# Patient Record
Sex: FEMALE | Race: WHITE | NOT HISPANIC OR LATINO | Employment: OTHER | URBAN - METROPOLITAN AREA
[De-identification: names, ages, dates, MRNs, and addresses within clinical notes are randomized per-mention and may not be internally consistent; named-entity substitution may affect disease eponyms.]

---

## 2024-02-20 ENCOUNTER — PRE-ADMISSION TESTING (OUTPATIENT)
Dept: PREADMISSION TESTING | Facility: HOSPITAL | Age: 84
End: 2024-02-20
Payer: MEDICARE

## 2024-02-20 VITALS
HEIGHT: 63 IN | BODY MASS INDEX: 29.69 KG/M2 | WEIGHT: 167.55 LBS | DIASTOLIC BLOOD PRESSURE: 74 MMHG | SYSTOLIC BLOOD PRESSURE: 132 MMHG | HEART RATE: 62 BPM | OXYGEN SATURATION: 97 % | TEMPERATURE: 95.9 F | RESPIRATION RATE: 18 BRPM

## 2024-02-20 DIAGNOSIS — Z01.818 PRE-OP EXAMINATION: Primary | ICD-10-CM

## 2024-02-20 LAB
ALBUMIN SERPL BCP-MCNC: 4 G/DL (ref 3.4–5)
ALP SERPL-CCNC: 52 U/L (ref 33–136)
ALT SERPL W P-5'-P-CCNC: 14 U/L (ref 7–45)
ANION GAP SERPL CALC-SCNC: 16 MMOL/L (ref 10–20)
APPEARANCE UR: ABNORMAL
AST SERPL W P-5'-P-CCNC: 15 U/L (ref 9–39)
BASOPHILS # BLD AUTO: 0.01 X10*3/UL (ref 0–0.1)
BASOPHILS NFR BLD AUTO: 0.2 %
BILIRUB SERPL-MCNC: 0.5 MG/DL (ref 0–1.2)
BILIRUB UR STRIP.AUTO-MCNC: NEGATIVE MG/DL
BUN SERPL-MCNC: 26 MG/DL (ref 6–23)
CALCIUM SERPL-MCNC: 8.8 MG/DL (ref 8.6–10.3)
CHLORIDE SERPL-SCNC: 105 MMOL/L (ref 98–107)
CO2 SERPL-SCNC: 26 MMOL/L (ref 21–32)
COLOR UR: YELLOW
CREAT SERPL-MCNC: 0.98 MG/DL (ref 0.5–1.05)
EGFRCR SERPLBLD CKD-EPI 2021: 58 ML/MIN/1.73M*2
EOSINOPHIL # BLD AUTO: 0.07 X10*3/UL (ref 0–0.4)
EOSINOPHIL NFR BLD AUTO: 1.2 %
ERYTHROCYTE [DISTWIDTH] IN BLOOD BY AUTOMATED COUNT: 13 % (ref 11.5–14.5)
GLUCOSE SERPL-MCNC: 107 MG/DL (ref 74–99)
GLUCOSE UR STRIP.AUTO-MCNC: NEGATIVE MG/DL
HCT VFR BLD AUTO: 38.4 % (ref 36–46)
HGB BLD-MCNC: 12.5 G/DL (ref 12–16)
IMM GRANULOCYTES # BLD AUTO: 0.02 X10*3/UL (ref 0–0.5)
IMM GRANULOCYTES NFR BLD AUTO: 0.3 % (ref 0–0.9)
KETONES UR STRIP.AUTO-MCNC: NEGATIVE MG/DL
LEUKOCYTE ESTERASE UR QL STRIP.AUTO: NEGATIVE
LYMPHOCYTES # BLD AUTO: 1.79 X10*3/UL (ref 0.8–3)
LYMPHOCYTES NFR BLD AUTO: 29.8 %
MCH RBC QN AUTO: 29.8 PG (ref 26–34)
MCHC RBC AUTO-ENTMCNC: 32.6 G/DL (ref 32–36)
MCV RBC AUTO: 91 FL (ref 80–100)
MONOCYTES # BLD AUTO: 0.46 X10*3/UL (ref 0.05–0.8)
MONOCYTES NFR BLD AUTO: 7.7 %
NEUTROPHILS # BLD AUTO: 3.66 X10*3/UL (ref 1.6–5.5)
NEUTROPHILS NFR BLD AUTO: 60.8 %
NITRITE UR QL STRIP.AUTO: NEGATIVE
NRBC BLD-RTO: 0 /100 WBCS (ref 0–0)
PH UR STRIP.AUTO: 5 [PH]
PLATELET # BLD AUTO: 153 X10*3/UL (ref 150–450)
POTASSIUM SERPL-SCNC: 4.1 MMOL/L (ref 3.5–5.3)
PROT SERPL-MCNC: 6.3 G/DL (ref 6.4–8.2)
PROT UR STRIP.AUTO-MCNC: NEGATIVE MG/DL
RBC # BLD AUTO: 4.2 X10*6/UL (ref 4–5.2)
RBC # UR STRIP.AUTO: NEGATIVE /UL
SODIUM SERPL-SCNC: 143 MMOL/L (ref 136–145)
SP GR UR STRIP.AUTO: 1.03
UROBILINOGEN UR STRIP.AUTO-MCNC: <2 MG/DL
WBC # BLD AUTO: 6 X10*3/UL (ref 4.4–11.3)

## 2024-02-20 PROCEDURE — 80053 COMPREHEN METABOLIC PANEL: CPT | Performed by: REGISTERED NURSE

## 2024-02-20 PROCEDURE — 85025 COMPLETE CBC W/AUTO DIFF WBC: CPT | Performed by: REGISTERED NURSE

## 2024-02-20 PROCEDURE — 93010 ELECTROCARDIOGRAM REPORT: CPT | Performed by: INTERNAL MEDICINE

## 2024-02-20 PROCEDURE — 81003 URINALYSIS AUTO W/O SCOPE: CPT | Performed by: REGISTERED NURSE

## 2024-02-20 PROCEDURE — 99204 OFFICE O/P NEW MOD 45 MIN: CPT | Performed by: REGISTERED NURSE

## 2024-02-20 PROCEDURE — 36415 COLL VENOUS BLD VENIPUNCTURE: CPT

## 2024-02-20 PROCEDURE — 87081 CULTURE SCREEN ONLY: CPT | Mod: GEALAB | Performed by: REGISTERED NURSE

## 2024-02-20 PROCEDURE — 93005 ELECTROCARDIOGRAM TRACING: CPT

## 2024-02-20 RX ORDER — CHLORHEXIDINE GLUCONATE ORAL RINSE 1.2 MG/ML
15 SOLUTION DENTAL 2 TIMES DAILY
Qty: 120 ML | Refills: 0 | Status: SHIPPED | OUTPATIENT
Start: 2024-02-20 | End: 2024-02-21

## 2024-02-20 RX ORDER — OXYBUTYNIN CHLORIDE 15 MG/1
15 TABLET, EXTENDED RELEASE ORAL DAILY
COMMUNITY

## 2024-02-20 RX ORDER — ATORVASTATIN CALCIUM 80 MG/1
80 TABLET, FILM COATED ORAL DAILY
COMMUNITY

## 2024-02-20 RX ORDER — SERTRALINE HYDROCHLORIDE 50 MG/1
50 TABLET, FILM COATED ORAL DAILY
COMMUNITY

## 2024-02-20 RX ORDER — LATANOPROST 50 UG/ML
1 SOLUTION/ DROPS OPHTHALMIC NIGHTLY
COMMUNITY

## 2024-02-20 RX ORDER — ACETAMINOPHEN 500 MG
TABLET ORAL EVERY 6 HOURS PRN
COMMUNITY
End: 2024-03-04 | Stop reason: HOSPADM

## 2024-02-20 RX ORDER — GINGER ROOT/GINGER ROOT EXT 262.5 MG
25000 CAPSULE ORAL DAILY
COMMUNITY
End: 2024-02-20 | Stop reason: ALTCHOICE

## 2024-02-20 RX ORDER — DICLOFENAC SODIUM 10 MG/G
4 GEL TOPICAL 4 TIMES DAILY PRN
COMMUNITY
End: 2024-02-20 | Stop reason: ALTCHOICE

## 2024-02-20 RX ORDER — LISINOPRIL 40 MG/1
40 TABLET ORAL DAILY
COMMUNITY

## 2024-02-20 ASSESSMENT — DUKE ACTIVITY SCORE INDEX (DASI)
CAN YOU PARTICIPATE IN MODERATE RECREATIONAL ACTIVITIES LIKE GOLF, BOWLING, DANCING, DOUBLES TENNIS OR THROWING A BASEBALL OR FOOTBALL: NO
CAN YOU WALK A BLOCK OR TWO ON LEVEL GROUND: YES
TOTAL_SCORE: 26.95
CAN YOU CLIMB A FLIGHT OF STAIRS OR WALK UP A HILL: YES
CAN YOU DO LIGHT WORK AROUND THE HOUSE LIKE DUSTING OR WASHING DISHES: YES
CAN YOU RUN A SHORT DISTANCE: NO
CAN YOU DO MODERATE WORK AROUND THE HOUSE LIKE VACUUMING, SWEEPING FLOORS OR CARRYING GROCERIES: YES
DASI METS SCORE: 6.1
CAN YOU WALK INDOORS, SUCH AS AROUND YOUR HOUSE: YES
CAN YOU TAKE CARE OF YOURSELF (EAT, DRESS, BATHE, OR USE TOILET): YES
CAN YOU PARTICIPATE IN STRENOUS SPORTS LIKE SWIMMING, SINGLES TENNIS, FOOTBALL, BASKETBALL, OR SKIING: NO
CAN YOU DO HEAVY WORK AROUND THE HOUSE LIKE SCRUBBING FLOORS OR LIFTING AND MOVING HEAVY FURNITURE: YES
CAN YOU DO YARD WORK LIKE RAKING LEAVES, WEEDING OR PUSHING A MOWER: NO
CAN YOU HAVE SEXUAL RELATIONS: NO

## 2024-02-20 ASSESSMENT — ENCOUNTER SYMPTOMS
JOINT SWELLING: 1
NECK NEGATIVE: 1
LIMITED RANGE OF MOTION: 1
ARTHRALGIAS: 1
NEUROLOGICAL NEGATIVE: 1
RESPIRATORY NEGATIVE: 1
GASTROINTESTINAL NEGATIVE: 1
CONSTITUTIONAL NEGATIVE: 1
CARDIOVASCULAR NEGATIVE: 1
EYES NEGATIVE: 1

## 2024-02-20 ASSESSMENT — LIFESTYLE VARIABLES: SMOKING_STATUS: NONSMOKER

## 2024-02-20 NOTE — PREPROCEDURE INSTRUCTIONS
Medication List            Accurate as of February 20, 2024  1:56 PM. Always use your most recent med list.                acetaminophen 500 mg tablet  Commonly known as: Tylenol  Medication Adjustments for Surgery: Stop 1 day before surgery     atorvastatin 80 mg tablet  Commonly known as: Lipitor  Medication Adjustments for Surgery: Take morning of surgery with sip of water, no other fluids     chlorhexidine 0.12 % solution  Commonly known as: Peridex  Use 15 mL in the mouth or throat 2 times a day for 1 day. Use 15ml once the night before surgery and  15ml once the morning of surgery  Medication Adjustments for Surgery: Other (Comment)     latanoprost 0.005 % ophthalmic solution  Commonly known as: Xalatan  Medication Adjustments for Surgery: Continue until night before surgery     lisinopril 40 mg tablet  Medication Adjustments for Surgery: Stop 1 day before surgery     oxybutynin XL 15 mg 24 hr tablet  Commonly known as: Ditropan-XL  Medication Adjustments for Surgery: Stop 1 day before surgery     sertraline 50 mg tablet  Commonly known as: Zoloft  Medication Adjustments for Surgery: Take morning of surgery with sip of water, no other fluids          SURGERY PRE-OPERATIVE INSTRUCTIONS    *You will receive a phone call the day before your procedure  after 2pm, (or the Friday before your surgery if scheduled on a Monday.) Generally the hospital will be calling you with this information after that time.    *You are not to eat after midnight the night before the surgery. You may have 8oz of a clear liquid up until 2 hours prior to arriving to the hospital. The exception is with medications you were instructed to take day of surgery.    *You may take tylenol for pain/discomfort as needed.     *Stop taking all aspirin products, ibuprofen (motrin/advil), naproxen (aleve/naprosyn) for one week prior to surgery.    *Stop taking all vitamins and supplements one week prior to surgery.     *You should not have  alcoholic beverages for 24 hours before surgery.     *You should not smoke 24 hours prior to surgery.     *To help prevent surgical infections bathe/shower with Dial soap the evening before surgery.    *You can wear deodorant but no lotion, powder, or perfume/cologne. You should remove all make-up and nail polish at home.    *If you wear glasses, please bring a case for the glasses with you.    *You will be asked to remove dentures and contacts.     *Please leave all valuables at home.    *You should wear loose, comfortable clothing that will accommodate bandages and/or casts.    *You should notify your doctor of any change in your condition (fever, cold, rash, etc). Surgery may need to be re-scheduled until a time you are in better health.    *A responsible adult is required to accompany you to and from the hospital if you are receiving anesthesia or a sedative. Patients are not permitted to drive for 24 hours after anesthesia.     *You can use the [employer] if you wish.     *If you have any further questions please call [facility] [phone].   CHG BODY WASH INSTRUCTIONS    *Begin using your CHG soap five days prior to your scheduled surgery.  Allow the CHG soap to sit on skin for 3 minutes. Do not wash with regular soap after you have used the CHG soap. Pat yourself dry with a clean, fresh towel.    *Wash your face with normal soap and water. Apply the CHG solution to a clean, wet washcloth. Firmly lather your entire body from the neck down. Do not use on your face.     *Do not apply powders, deodorants, or lotions after using CHG wash.    *Dress in clean, freshly laundered night clothes.    *Be sure to sleep with clean, freshly laundered sheets.     *Be aware CHG wash may cause stains on fabrics. Rinse your washcloth and other linens that come in contact with CHG completely. Use non-chlorine detergents to launder items used.     *The morning of surgery is the fifth day, repeat the CHG wash and wear fresh  laundered clothes.     *If you have any questions about the CHG soap, call [phone].      MOUTHRINSE INSTRUCTIONS    *CHG oral rinse is used to kill a bacteria in the mouth known as Staphylococcus aureus. This reduces the risks of surgical site infections.     *Using dental rinse: use the CHG oral rinse after you brush your teeth the night before and the morning of the surgery. Follow all directions on your prescription label.     *Use 1 capful (15ml), swish and gargle for at least 30 seconds. Do not swallow. Spit rinse out.     *Do not rinse mouth with water, eat or drink after using CHG mouth rinse.     *Possible side effects: CHG rinse will stick to plaque on teeth. Brush and floss just before use. Teeth brushing will help to avoid staining of plaque during use.    *Any questions, please call [phone].                       NPO Instructions:    Do not eat any food after midnight the night before your surgery/procedure.    Additional Instructions:     Review your medication instructions, stop indicated medications

## 2024-02-20 NOTE — H&P (VIEW-ONLY)
CPM/PAT Evaluation       Name: Samanta Muñoz (Samanta Muñoz)  /Age: 1940/83 y.o.     In-Person       Chief Complaint: Evaluation prior to surgery    HPI  83 year old female scheduled for ARTHROPLASTY RESURFACING TOTAL KNEE - Left on 3/4/24 with Dr. Ibarra secondary to primary osteoarthritis of left knee. PMHx includes TN, HLD, DM and depression. Presents to CPM today for preoperative risk stratification and optimization.   Past Medical History:   Diagnosis Date    Arthritis     Depression     Glaucoma     Hyperlipidemia     Hypertension     Macular degeneration     Personal history of other diseases of the circulatory system 2014    History of hypertension    Personal history of other diseases of the circulatory system 2014    Personal history of cardiac murmur    Personal history of other diseases of the musculoskeletal system and connective tissue 2014    Personal history of arthritis    Personal history of other infectious and parasitic diseases 2014    History of mumps    Personal history of other mental and behavioral disorders 2014    History of depression    Sleep apnea     Unspecified visual disturbance 2014    Vision disorder       Past Surgical History:   Procedure Laterality Date    APPENDECTOMY  2014    Appendectomy    BREAST BIOPSY      CATARACT EXTRACTION  2014    Cataract Surgery    COLONOSCOPY      ESOPHAGOGASTRODUODENOSCOPY      KNEE ARTHROPLASTY      KNEE SURGERY  2014    Knee Surgery Right       Patient Sexual activity questions deferred to the physician.    No family history on file.    Allergies   Allergen Reactions    Iodinated Contrast Media Rash       Prior to Admission medications    Not on File        PAT ROS:   Constitutional:   neg    Neuro/Psych:   neg    Eyes:   neg    Ears:   neg    Nose:   Mouth:   neg    Throat:   neg    Neck:    Decreased ROM  neg    Cardio:   neg    Respiratory:   neg    Endocrine:   GI:   neg    :    neg    Musculoskeletal:    Left knee   arthralgias   decreased ROM   swelling  Hematologic:   neg    Skin:  neg        Physical Exam  Vitals reviewed.   Constitutional:       Appearance: Normal appearance.   HENT:      Head: Normocephalic and atraumatic.      Nose: Nose normal.   Eyes:      Pupils: Pupils are equal, round, and reactive to light.   Neck:      Vascular: No carotid bruit.      Comments: Decreased ROM  Cardiovascular:      Rate and Rhythm: Normal rate and regular rhythm.      Pulses: Normal pulses.      Heart sounds: Murmur (2/6) heard.   Pulmonary:      Effort: Pulmonary effort is normal.      Breath sounds: Normal breath sounds.   Abdominal:      Palpations: Abdomen is soft.   Musculoskeletal:         General: Swelling and tenderness present.      Cervical back: Normal range of motion and neck supple.      Comments: Left knee   Skin:     General: Skin is warm and dry.      Capillary Refill: Capillary refill takes less than 2 seconds.   Neurological:      Mental Status: She is alert and oriented to person, place, and time.   Psychiatric:         Mood and Affect: Mood normal.         Behavior: Behavior normal.         Thought Content: Thought content normal.         Judgment: Judgment normal.          PAT AIRWAY:   Airway:     Mallampati::  III    TM distance::  >3 FB    Neck ROM::  Limited  normal        There were no vitals taken for this visit.    DASI Risk Score    No data to display       Caprini DVT Assessment      Flowsheet Row Most Recent Value   DVT Score 12   Current Status Major surgery planned, including arthroscopic and laproscopic (1-2 hours), Elective major lower extremity arthroplasty   History Prior major surgery   Age Over 75 years   BMI 30 or less          Modified Frailty Index    No data to display       CHADS2 Stroke Risk  Current as of 24 minutes ago        N/A 3 - 100%: High Risk   2 - 3%: Medium Risk   0 - 2%: Low Risk     Last Change: N/A          This score determines the  patient's risk of having a stroke if the patient has atrial fibrillation.        This score is not applicable to this patient. Components are not calculated.          Revised Cardiac Risk Index      Flowsheet Row Most Recent Value   Revised Cardiac Risk Calculator 0          Apfel Simplified Score      Flowsheet Row Most Recent Value   Apfel Simplified Score Calculator 3          Risk Analysis Index Results This Encounter    No data found in the last 1 encounters.       Stop Bang Score      Flowsheet Row Most Recent Value   Do you snore loudly? 1   Do you often feel tired or fatigued after your sleep? 0   Has anyone ever observed you stop breathing in your sleep? 0   Do you have or are you being treated for high blood pressure? 1   Recent BMI (Calculated) 29.7   Is BMI greater than 35 kg/m2? 0=No   Age older than 50 years old? 1=Yes   Is your neck circumference greater than 17 inches (Male) or 16 inches (Female)? 0   Gender - Male 0=No   STOP-BANG Total Score 3            Assessment and Plan:     Anesthesia:  The patient denies problems with anesthesia in the past such as PONV, prolonged sedation, awareness, dental damage, aspiration, cardiac arrest, difficult intubation, or unexpected hospital admissions.  Limited ROM of neck.    Neuro:   The patient has a history of depression controlled with Zoloft.  The patient is at increased risk for perioperative stroke secondary to hypertension , increased age, hyperlipidemia, female gender, diabetes mellitus.    HEENT/Airway  No diagnoses, significant findings on chart review, clinical presentation, or evaluation.    Cardiovascular  The patient has a history of HTN, HLD and cardiac murmur. HLD controlled with lipitor.   RCRI  The patient meets 0-1 RCRI criteria and therefore has a less than 1% risk of major adverse cardiac complications.  METS  The patient's functional capacity capacity is greater than 4 METS.  EKG  The patient has no EKG or echocardiographic changes  concerning for myocardial ischemia. EKG 2/20/24 Sinus bradycardia, left axis deviation, right bundle branch block Rate 54.  EKG 10/26/23 NSR Septal infarct, abnormal EKG. When compared to EKG of 3/15/18 left anterior fascicular block is no longer present Rate 60.  Heart Failure  The patient has no known history of heart failure.  Additionally, the patient reports no symptoms of heart failure and demonstrates no signs of heart failure.  Hypertension Evaluation  The patient has a known history of hypertension that is controlled on lisinopril.   Heart Rhythm Evaluation  The patient has no history of arrhythmias.  Heart Valve Evaluation  The patient has a known history of heart murmur.  CARDS EVAL  The patient is not followed by cardiology.  DULCE score which indicates a 0.8% risk of intraoperative or 30-day postoperative.    Pulmonary   The patient has findings on chart review, clinical presentation and evaluation significant for LEO. The patient is at increased risk of perioperative pulmonary complications secondary to LEO, advanced age greater than 60.Does not use device for LEO.  The patient has a stop bang score of 3, which places patient at intermediate risk for having LEO.    ARISCAT 16, low, 1.6% risk of in-hospital postoperative pulmonary complications  PRODIGY 21, high risk of respiratory depression episode.    Hematology  No diagnoses or significant findings on chart review or clinical presentation and evaluation.  Antiplatelet management   The patient is not currently receiving antiplatelet therapy.  Anticoagulation management  The patient is not currently receiving anticoagulation therapy.  Caprini score 12, high risk of perioperative VTE    Gastrointestinal  No diagnoses or significant findings on chart review or clinical presentation and evaluation.  Eat 10- 0,  self-perceived oropharyngeal dysphagia scale (0-40)     Genitourinary  The patient has diagnoses or significant findings on chart review or  clinical presentation and evaluation significant for Overactive bladded controlled with oxybutynin xl.     Renal  The patient has no known history of chronic kidney disease. The patient has specific risk factors associated with increased risk of perioperative renal complications due to age greater than 55, hypertension.    Musculoskeletal  The patient has diagnoses or significant findings on chart review or clinical presentation and evaluation significant for primary osteoarthritis of left knee.    Endocrine  Diabetes Evaluation  The patient has history of diabetes mellitus controlled and not on medication. A1C 10/24/23 6.1  Thyroid Disease Evaluation  The patient has no history of thyroid disease.    ID  MRSA screening obtained. Instructions given for Hibiclens and Peridex. Prescriptions given for Peridex.    -Preoperative medication instructions were provided and reviewed with the patient.  Any additional testing or evaluation was explained to the patient.  NPO Instructions were discussed, and the patient's questions were answered prior to conclusion of this encounter

## 2024-02-21 LAB — HOLD SPECIMEN: NORMAL

## 2024-02-22 LAB — STAPHYLOCOCCUS SPEC CULT: NORMAL

## 2024-02-28 ENCOUNTER — ANESTHESIA EVENT (OUTPATIENT)
Dept: OPERATING ROOM | Facility: HOSPITAL | Age: 84
End: 2024-02-28
Payer: MEDICARE

## 2024-03-01 ENCOUNTER — TELEPHONE (OUTPATIENT)
Dept: INPATIENT UNIT | Facility: HOSPITAL | Age: 84
End: 2024-03-01
Payer: MEDICARE

## 2024-03-01 RX ORDER — SODIUM CHLORIDE, SODIUM LACTATE, POTASSIUM CHLORIDE, CALCIUM CHLORIDE 600; 310; 30; 20 MG/100ML; MG/100ML; MG/100ML; MG/100ML
100 INJECTION, SOLUTION INTRAVENOUS CONTINUOUS
Status: CANCELLED | OUTPATIENT
Start: 2024-03-01

## 2024-03-01 RX ORDER — DROPERIDOL 2.5 MG/ML
0.62 INJECTION, SOLUTION INTRAMUSCULAR; INTRAVENOUS ONCE AS NEEDED
Status: CANCELLED | OUTPATIENT
Start: 2024-03-01

## 2024-03-04 ENCOUNTER — ANESTHESIA (OUTPATIENT)
Dept: OPERATING ROOM | Facility: HOSPITAL | Age: 84
End: 2024-03-04
Payer: MEDICARE

## 2024-03-04 ENCOUNTER — APPOINTMENT (OUTPATIENT)
Dept: RADIOLOGY | Facility: HOSPITAL | Age: 84
End: 2024-03-04
Payer: MEDICARE

## 2024-03-04 ENCOUNTER — HOME HEALTH ADMISSION (OUTPATIENT)
Dept: HOME HEALTH SERVICES | Facility: HOME HEALTH | Age: 84
End: 2024-03-04
Payer: MEDICARE

## 2024-03-04 ENCOUNTER — HOSPITAL ENCOUNTER (OUTPATIENT)
Facility: HOSPITAL | Age: 84
Setting detail: OUTPATIENT SURGERY
Discharge: HOME HEALTH | End: 2024-03-04
Attending: ORTHOPAEDIC SURGERY | Admitting: ORTHOPAEDIC SURGERY
Payer: MEDICARE

## 2024-03-04 ENCOUNTER — DOCUMENTATION (OUTPATIENT)
Dept: HOME HEALTH SERVICES | Facility: HOME HEALTH | Age: 84
End: 2024-03-04

## 2024-03-04 VITALS
DIASTOLIC BLOOD PRESSURE: 67 MMHG | SYSTOLIC BLOOD PRESSURE: 148 MMHG | TEMPERATURE: 97.2 F | RESPIRATION RATE: 19 BRPM | OXYGEN SATURATION: 96 % | HEART RATE: 80 BPM

## 2024-03-04 DIAGNOSIS — Z96.652 STATUS POST TOTAL KNEE REPLACEMENT, LEFT: Primary | ICD-10-CM

## 2024-03-04 PROCEDURE — 3600000010 HC OR TIME - EACH INCREMENTAL 1 MINUTE - PROCEDURE LEVEL FIVE: Performed by: ORTHOPAEDIC SURGERY

## 2024-03-04 PROCEDURE — 3700000002 HC GENERAL ANESTHESIA TIME - EACH INCREMENTAL 1 MINUTE: Performed by: ORTHOPAEDIC SURGERY

## 2024-03-04 PROCEDURE — 7100000009 HC PHASE TWO TIME - INITIAL BASE CHARGE: Performed by: ORTHOPAEDIC SURGERY

## 2024-03-04 PROCEDURE — A4217 STERILE WATER/SALINE, 500 ML: HCPCS | Mod: MUE | Performed by: ORTHOPAEDIC SURGERY

## 2024-03-04 PROCEDURE — 2500000001 HC RX 250 WO HCPCS SELF ADMINISTERED DRUGS (ALT 637 FOR MEDICARE OP): Performed by: ANESTHESIOLOGY

## 2024-03-04 PROCEDURE — 2720000007 HC OR 272 NO HCPCS: Performed by: ORTHOPAEDIC SURGERY

## 2024-03-04 PROCEDURE — 73560 X-RAY EXAM OF KNEE 1 OR 2: CPT | Mod: LT

## 2024-03-04 PROCEDURE — 2780000003 HC OR 278 NO HCPCS: Performed by: ORTHOPAEDIC SURGERY

## 2024-03-04 PROCEDURE — 97110 THERAPEUTIC EXERCISES: CPT | Mod: GP

## 2024-03-04 PROCEDURE — 2500000004 HC RX 250 GENERAL PHARMACY W/ HCPCS (ALT 636 FOR OP/ED): Performed by: ANESTHESIOLOGY

## 2024-03-04 PROCEDURE — A9999 DME SUPPLY OR ACCESSORY, NOS: HCPCS | Performed by: ORTHOPAEDIC SURGERY

## 2024-03-04 PROCEDURE — A6213 FOAM DRG >16<=48 SQ IN W/BDR: HCPCS | Performed by: ORTHOPAEDIC SURGERY

## 2024-03-04 PROCEDURE — 7100000010 HC PHASE TWO TIME - EACH INCREMENTAL 1 MINUTE: Performed by: ORTHOPAEDIC SURGERY

## 2024-03-04 PROCEDURE — 2500000005 HC RX 250 GENERAL PHARMACY W/O HCPCS: Performed by: NURSE ANESTHETIST, CERTIFIED REGISTERED

## 2024-03-04 PROCEDURE — 73560 X-RAY EXAM OF KNEE 1 OR 2: CPT | Mod: LEFT SIDE | Performed by: RADIOLOGY

## 2024-03-04 PROCEDURE — 97116 GAIT TRAINING THERAPY: CPT | Mod: GP

## 2024-03-04 PROCEDURE — 97161 PT EVAL LOW COMPLEX 20 MIN: CPT | Mod: GP

## 2024-03-04 PROCEDURE — 2500000004 HC RX 250 GENERAL PHARMACY W/ HCPCS (ALT 636 FOR OP/ED): Performed by: NURSE ANESTHETIST, CERTIFIED REGISTERED

## 2024-03-04 PROCEDURE — 3700000001 HC GENERAL ANESTHESIA TIME - INITIAL BASE CHARGE: Performed by: ORTHOPAEDIC SURGERY

## 2024-03-04 PROCEDURE — A27447 PR TOTAL KNEE ARTHROPLASTY: Performed by: NURSE ANESTHETIST, CERTIFIED REGISTERED

## 2024-03-04 PROCEDURE — 3600000005 HC OR TIME - INITIAL BASE CHARGE - PROCEDURE LEVEL FIVE: Performed by: ORTHOPAEDIC SURGERY

## 2024-03-04 PROCEDURE — C1776 JOINT DEVICE (IMPLANTABLE): HCPCS | Performed by: ORTHOPAEDIC SURGERY

## 2024-03-04 PROCEDURE — 64447 NJX AA&/STRD FEMORAL NRV IMG: CPT | Performed by: NURSE ANESTHETIST, CERTIFIED REGISTERED

## 2024-03-04 PROCEDURE — 7100000002 HC RECOVERY ROOM TIME - EACH INCREMENTAL 1 MINUTE: Performed by: ORTHOPAEDIC SURGERY

## 2024-03-04 PROCEDURE — 2500000004 HC RX 250 GENERAL PHARMACY W/ HCPCS (ALT 636 FOR OP/ED): Performed by: ORTHOPAEDIC SURGERY

## 2024-03-04 PROCEDURE — 7100000001 HC RECOVERY ROOM TIME - INITIAL BASE CHARGE: Performed by: ORTHOPAEDIC SURGERY

## 2024-03-04 DEVICE — TIBIAL BEARING INSERT - CS
Type: IMPLANTABLE DEVICE | Site: KNEE | Status: FUNCTIONAL
Brand: TRIATHLON

## 2024-03-04 DEVICE — PATELLA
Type: IMPLANTABLE DEVICE | Site: KNEE | Status: FUNCTIONAL
Brand: TRIATHLON

## 2024-03-04 DEVICE — TIBIAL COMPONENT
Type: IMPLANTABLE DEVICE | Site: KNEE | Status: FUNCTIONAL
Brand: TRIATHLON

## 2024-03-04 DEVICE — CRUCIATE RETAINING FEMORAL
Type: IMPLANTABLE DEVICE | Site: KNEE | Status: FUNCTIONAL
Brand: TRIATHLON

## 2024-03-04 RX ORDER — ONDANSETRON 4 MG/1
4 TABLET, ORALLY DISINTEGRATING ORAL EVERY 8 HOURS PRN
Status: CANCELLED | OUTPATIENT
Start: 2024-03-04

## 2024-03-04 RX ORDER — SODIUM CHLORIDE, SODIUM LACTATE, POTASSIUM CHLORIDE, CALCIUM CHLORIDE 600; 310; 30; 20 MG/100ML; MG/100ML; MG/100ML; MG/100ML
100 INJECTION, SOLUTION INTRAVENOUS CONTINUOUS
Status: CANCELLED | OUTPATIENT
Start: 2024-03-04 | End: 2024-03-05

## 2024-03-04 RX ORDER — TRANEXAMIC ACID 100 MG/ML
INJECTION, SOLUTION INTRAVENOUS AS NEEDED
Status: DISCONTINUED | OUTPATIENT
Start: 2024-03-04 | End: 2024-03-04

## 2024-03-04 RX ORDER — DOCUSATE SODIUM 100 MG/1
100 CAPSULE, LIQUID FILLED ORAL 2 TIMES DAILY PRN
COMMUNITY
Start: 2024-03-04

## 2024-03-04 RX ORDER — OXYBUTYNIN CHLORIDE 5 MG/1
5 TABLET ORAL 2 TIMES DAILY
Status: CANCELLED | OUTPATIENT
Start: 2024-03-04

## 2024-03-04 RX ORDER — ACETAMINOPHEN 325 MG/1
975 TABLET ORAL EVERY 6 HOURS SCHEDULED
Status: CANCELLED | OUTPATIENT
Start: 2024-03-04

## 2024-03-04 RX ORDER — DOCUSATE SODIUM 100 MG/1
100 CAPSULE, LIQUID FILLED ORAL 2 TIMES DAILY
Status: CANCELLED | OUTPATIENT
Start: 2024-03-04

## 2024-03-04 RX ORDER — KETOROLAC TROMETHAMINE 15 MG/ML
15 INJECTION, SOLUTION INTRAMUSCULAR; INTRAVENOUS EVERY 6 HOURS
Status: CANCELLED | OUTPATIENT
Start: 2024-03-04 | End: 2024-03-05

## 2024-03-04 RX ORDER — OXYCODONE HYDROCHLORIDE 5 MG/1
10 TABLET ORAL EVERY 6 HOURS PRN
Status: CANCELLED | OUTPATIENT
Start: 2024-03-04

## 2024-03-04 RX ORDER — CELECOXIB 200 MG/1
200 CAPSULE ORAL 2 TIMES DAILY PRN
COMMUNITY
Start: 2024-03-04

## 2024-03-04 RX ORDER — CEFAZOLIN SODIUM 2 G/100ML
2 INJECTION, SOLUTION INTRAVENOUS EVERY 8 HOURS
Status: CANCELLED | OUTPATIENT
Start: 2024-03-04 | End: 2024-03-05

## 2024-03-04 RX ORDER — SODIUM CHLORIDE, SODIUM LACTATE, POTASSIUM CHLORIDE, CALCIUM CHLORIDE 600; 310; 30; 20 MG/100ML; MG/100ML; MG/100ML; MG/100ML
100 INJECTION, SOLUTION INTRAVENOUS CONTINUOUS
Status: DISCONTINUED | OUTPATIENT
Start: 2024-03-04 | End: 2024-03-04 | Stop reason: HOSPADM

## 2024-03-04 RX ORDER — CEFAZOLIN 1 G/1
INJECTION, POWDER, FOR SOLUTION INTRAVENOUS AS NEEDED
Status: DISCONTINUED | OUTPATIENT
Start: 2024-03-04 | End: 2024-03-04

## 2024-03-04 RX ORDER — ASCORBIC ACID 500 MG
500 TABLET ORAL 2 TIMES DAILY
COMMUNITY
Start: 2024-03-04

## 2024-03-04 RX ORDER — NALOXONE HYDROCHLORIDE 0.4 MG/ML
0.2 INJECTION, SOLUTION INTRAMUSCULAR; INTRAVENOUS; SUBCUTANEOUS EVERY 5 MIN PRN
Status: CANCELLED | OUTPATIENT
Start: 2024-03-04

## 2024-03-04 RX ORDER — FENTANYL CITRATE 50 UG/ML
INJECTION, SOLUTION INTRAMUSCULAR; INTRAVENOUS AS NEEDED
Status: DISCONTINUED | OUTPATIENT
Start: 2024-03-04 | End: 2024-03-04

## 2024-03-04 RX ORDER — CARISOPRODOL 350 MG/1
350 TABLET ORAL 3 TIMES DAILY PRN
Status: CANCELLED | OUTPATIENT
Start: 2024-03-04

## 2024-03-04 RX ORDER — GABAPENTIN 300 MG/1
300 CAPSULE ORAL 3 TIMES DAILY PRN
COMMUNITY
Start: 2024-03-04

## 2024-03-04 RX ORDER — LIDOCAINE HYDROCHLORIDE 10 MG/ML
INJECTION, SOLUTION EPIDURAL; INFILTRATION; INTRACAUDAL; PERINEURAL AS NEEDED
Status: DISCONTINUED | OUTPATIENT
Start: 2024-03-04 | End: 2024-03-04

## 2024-03-04 RX ORDER — ASPIRIN 325 MG
325 TABLET, DELAYED RELEASE (ENTERIC COATED) ORAL 2 TIMES DAILY
COMMUNITY
Start: 2024-03-04

## 2024-03-04 RX ORDER — ONDANSETRON HYDROCHLORIDE 2 MG/ML
INJECTION, SOLUTION INTRAVENOUS AS NEEDED
Status: DISCONTINUED | OUTPATIENT
Start: 2024-03-04 | End: 2024-03-04

## 2024-03-04 RX ORDER — BUPIVACAINE HYDROCHLORIDE 7.5 MG/ML
INJECTION, SOLUTION INTRASPINAL AS NEEDED
Status: DISCONTINUED | OUTPATIENT
Start: 2024-03-04 | End: 2024-03-04

## 2024-03-04 RX ORDER — CARISOPRODOL 350 MG/1
350 TABLET ORAL 3 TIMES DAILY PRN
Qty: 20 TABLET | Refills: 0 | COMMUNITY
Start: 2024-03-04

## 2024-03-04 RX ORDER — OXYCODONE HYDROCHLORIDE 5 MG/1
5 TABLET ORAL EVERY 4 HOURS PRN
Status: DISCONTINUED | OUTPATIENT
Start: 2024-03-04 | End: 2024-03-04 | Stop reason: HOSPADM

## 2024-03-04 RX ORDER — SODIUM CHLORIDE 0.9 G/100ML
IRRIGANT IRRIGATION AS NEEDED
Status: DISCONTINUED | OUTPATIENT
Start: 2024-03-04 | End: 2024-03-04 | Stop reason: HOSPADM

## 2024-03-04 RX ORDER — MORPHINE SULFATE 2 MG/ML
2 INJECTION, SOLUTION INTRAMUSCULAR; INTRAVENOUS
Status: CANCELLED | OUTPATIENT
Start: 2024-03-04

## 2024-03-04 RX ORDER — PROPOFOL 10 MG/ML
INJECTION, EMULSION INTRAVENOUS AS NEEDED
Status: DISCONTINUED | OUTPATIENT
Start: 2024-03-04 | End: 2024-03-04

## 2024-03-04 RX ORDER — LATANOPROST 50 UG/ML
1 SOLUTION/ DROPS OPHTHALMIC NIGHTLY
Status: CANCELLED | OUTPATIENT
Start: 2024-03-04

## 2024-03-04 RX ORDER — PROPOFOL 10 MG/ML
INJECTION, EMULSION INTRAVENOUS CONTINUOUS PRN
Status: DISCONTINUED | OUTPATIENT
Start: 2024-03-04 | End: 2024-03-04

## 2024-03-04 RX ORDER — ONDANSETRON HYDROCHLORIDE 2 MG/ML
4 INJECTION, SOLUTION INTRAVENOUS EVERY 8 HOURS PRN
Status: CANCELLED | OUTPATIENT
Start: 2024-03-04

## 2024-03-04 RX ORDER — ASPIRIN 325 MG
325 TABLET, DELAYED RELEASE (ENTERIC COATED) ORAL 2 TIMES DAILY
Status: CANCELLED | OUTPATIENT
Start: 2024-03-05

## 2024-03-04 RX ORDER — ONDANSETRON HYDROCHLORIDE 2 MG/ML
4 INJECTION, SOLUTION INTRAVENOUS ONCE AS NEEDED
Status: COMPLETED | OUTPATIENT
Start: 2024-03-04 | End: 2024-03-04

## 2024-03-04 RX ORDER — ONDANSETRON 4 MG/1
4 TABLET, FILM COATED ORAL EVERY 6 HOURS PRN
Qty: 20 TABLET | Refills: 0 | Status: SHIPPED | OUTPATIENT
Start: 2024-03-04

## 2024-03-04 RX ORDER — OXYCODONE HYDROCHLORIDE 5 MG/1
5 TABLET ORAL EVERY 6 HOURS PRN
Status: CANCELLED | OUTPATIENT
Start: 2024-03-04

## 2024-03-04 RX ORDER — DIPHENHYDRAMINE HYDROCHLORIDE 50 MG/ML
12.5 INJECTION INTRAMUSCULAR; INTRAVENOUS EVERY 6 HOURS PRN
Status: CANCELLED | OUTPATIENT
Start: 2024-03-04

## 2024-03-04 RX ORDER — ATORVASTATIN CALCIUM 80 MG/1
80 TABLET, FILM COATED ORAL DAILY
Status: CANCELLED | OUTPATIENT
Start: 2024-03-04

## 2024-03-04 RX ORDER — SERTRALINE HYDROCHLORIDE 50 MG/1
50 TABLET, FILM COATED ORAL DAILY
Status: CANCELLED | OUTPATIENT
Start: 2024-03-04

## 2024-03-04 RX ORDER — OXYCODONE AND ACETAMINOPHEN 5; 325 MG/1; MG/1
1 TABLET ORAL EVERY 6 HOURS PRN
Qty: 5 TABLET | Refills: 0 | COMMUNITY
Start: 2024-03-04

## 2024-03-04 RX ORDER — LISINOPRIL 5 MG/1
40 TABLET ORAL DAILY
Status: CANCELLED | OUTPATIENT
Start: 2024-03-04

## 2024-03-04 RX ORDER — GABAPENTIN 300 MG/1
300 CAPSULE ORAL 3 TIMES DAILY PRN
Status: CANCELLED | OUTPATIENT
Start: 2024-03-04

## 2024-03-04 RX ORDER — TRAMADOL HYDROCHLORIDE 50 MG/1
50 TABLET ORAL EVERY 8 HOURS PRN
Status: CANCELLED | OUTPATIENT
Start: 2024-03-04

## 2024-03-04 RX ORDER — DEXAMETHASONE SODIUM PHOSPHATE 4 MG/ML
INJECTION, SOLUTION INTRA-ARTICULAR; INTRALESIONAL; INTRAMUSCULAR; INTRAVENOUS; SOFT TISSUE AS NEEDED
Status: DISCONTINUED | OUTPATIENT
Start: 2024-03-04 | End: 2024-03-04

## 2024-03-04 RX ADMIN — CEFAZOLIN 2 G: 330 INJECTION, POWDER, FOR SOLUTION INTRAMUSCULAR; INTRAVENOUS at 10:24

## 2024-03-04 RX ADMIN — ONDANSETRON 4 MG: 2 INJECTION, SOLUTION INTRAMUSCULAR; INTRAVENOUS at 10:41

## 2024-03-04 RX ADMIN — TRANEXAMIC ACID 1000 MG: 100 INJECTION, SOLUTION INTRAVENOUS at 11:38

## 2024-03-04 RX ADMIN — PROPOFOL 40 MG: 10 INJECTION, EMULSION INTRAVENOUS at 12:24

## 2024-03-04 RX ADMIN — DEXAMETHASONE SODIUM PHOSPHATE 4 MG: 4 INJECTION INTRA-ARTICULAR; INTRALESIONAL; INTRAMUSCULAR; INTRAVENOUS; SOFT TISSUE at 10:49

## 2024-03-04 RX ADMIN — ONDANSETRON 4 MG: 2 INJECTION, SOLUTION INTRAMUSCULAR; INTRAVENOUS at 11:44

## 2024-03-04 RX ADMIN — FENTANYL CITRATE 50 MCG: 50 INJECTION, SOLUTION INTRAMUSCULAR; INTRAVENOUS at 10:14

## 2024-03-04 RX ADMIN — FENTANYL CITRATE 50 MCG: 50 INJECTION, SOLUTION INTRAMUSCULAR; INTRAVENOUS at 10:24

## 2024-03-04 RX ADMIN — ONDANSETRON 4 MG: 2 INJECTION INTRAMUSCULAR; INTRAVENOUS at 14:42

## 2024-03-04 RX ADMIN — PROPOFOL 40 MG: 10 INJECTION, EMULSION INTRAVENOUS at 12:28

## 2024-03-04 RX ADMIN — SODIUM CHLORIDE, POTASSIUM CHLORIDE, SODIUM LACTATE AND CALCIUM CHLORIDE: 600; 310; 30; 20 INJECTION, SOLUTION INTRAVENOUS at 10:46

## 2024-03-04 RX ADMIN — SODIUM CHLORIDE, POTASSIUM CHLORIDE, SODIUM LACTATE AND CALCIUM CHLORIDE 100 ML/HR: 600; 310; 30; 20 INJECTION, SOLUTION INTRAVENOUS at 08:44

## 2024-03-04 RX ADMIN — SODIUM CHLORIDE, POTASSIUM CHLORIDE, SODIUM LACTATE AND CALCIUM CHLORIDE: 600; 310; 30; 20 INJECTION, SOLUTION INTRAVENOUS at 12:31

## 2024-03-04 RX ADMIN — PROPOFOL 30 MCG/KG/MIN: 10 INJECTION, EMULSION INTRAVENOUS at 10:15

## 2024-03-04 RX ADMIN — OXYCODONE HYDROCHLORIDE 5 MG: 5 TABLET ORAL at 13:58

## 2024-03-04 RX ADMIN — PROPOFOL 50 MG: 10 INJECTION, EMULSION INTRAVENOUS at 10:15

## 2024-03-04 RX ADMIN — LIDOCAINE HYDROCHLORIDE 50 ML: 10 INJECTION, SOLUTION EPIDURAL; INFILTRATION; INTRACAUDAL; PERINEURAL at 10:15

## 2024-03-04 RX ADMIN — TRANEXAMIC ACID 1000 MG: 100 INJECTION, SOLUTION INTRAVENOUS at 10:26

## 2024-03-04 RX ADMIN — BUPIVACAINE HYDROCHLORIDE IN DEXTROSE 1.6 ML: 7.5 INJECTION, SOLUTION SUBARACHNOID at 10:21

## 2024-03-04 SDOH — HEALTH STABILITY: MENTAL HEALTH: CURRENT SMOKER: 0

## 2024-03-04 ASSESSMENT — COGNITIVE AND FUNCTIONAL STATUS - GENERAL
CLIMB 3 TO 5 STEPS WITH RAILING: A LITTLE
STANDING UP FROM CHAIR USING ARMS: A LITTLE
MOVING TO AND FROM BED TO CHAIR: A LITTLE
WALKING IN HOSPITAL ROOM: A LITTLE
MOBILITY SCORE: 20

## 2024-03-04 ASSESSMENT — ACTIVITIES OF DAILY LIVING (ADL): ADL_ASSISTANCE: INDEPENDENT

## 2024-03-04 ASSESSMENT — PAIN SCALES - GENERAL
PAINLEVEL_OUTOF10: 8
PAINLEVEL_OUTOF10: 6
PAIN_LEVEL: 0

## 2024-03-04 ASSESSMENT — PAIN - FUNCTIONAL ASSESSMENT
PAIN_FUNCTIONAL_ASSESSMENT: 0-10
PAIN_FUNCTIONAL_ASSESSMENT: 0-10

## 2024-03-04 NOTE — ANESTHESIA PROCEDURE NOTES
Peripheral Block    Patient location during procedure: pre-op  Start time: 3/4/2024 9:00 AM  End time: 3/4/2024 9:13 AM  Reason for block: procedure for pain, at surgeon's request and post-op pain management  Staffing  Performed: attending   Authorized by: Lito Harris DO    Performed by: Lito Harris DO  Preanesthetic Checklist  Completed: patient identified, IV checked, site marked, risks and benefits discussed, surgical consent, monitors and equipment checked, pre-op evaluation and timeout performed   Timeout performed at:   Peripheral Block  Patient position: laying flat  Prep: ChloraPrep  Patient monitoring: continuous pulse ox  Block type: adductor canal  Laterality: left  Injection technique: single-shot  Guidance: ultrasound guided  Local infiltration: bupivicaine  Infiltration strength: 0.5 %  Dose: 15 mL  Needle  Needle type: pencil-tip   Needle gauge: 22 G  Needle length: 8 cm  Needle localization: ultrasound guidance  Assessment  Injection assessment: local visualized surrounding nerve on ultrasound  Paresthesia pain: none  Heart rate change: no  Slow fractionated injection: yes

## 2024-03-04 NOTE — ANESTHESIA PREPROCEDURE EVALUATION
Patient: Samanta Muñoz    Procedure Information       Date/Time: 03/04/24 1000    Procedure: ARTHROPLASTY  TOTAL KNEE (Left: Knee)    Location: [facility] / [facility]    Surgeons: Caio Ibarra, DO            Relevant Problems   Anesthesia   (-) PONV (postoperative nausea and vomiting)      Cardiovascular (within normal limits)      Pulmonary (within normal limits)       Clinical information reviewed:   Tobacco  Allergies  Meds   Med Hx  Surg Hx  OB Status  Fam Hx  Soc   Hx        NPO Detail:  NPO/Void Status  Carbohydrate Drink Given Prior to Surgery? : N  Date of Last Liquid: 03/03/24  Time of Last Liquid: 2300  Date of Last Solid: 03/03/24  Time of Last Solid: 2300  Last Intake Type: Clear fluids  Time of Last Void: 0700         Physical Exam    Airway  Mallampati: II  TM distance: >3 FB  Neck ROM: full     Cardiovascular - normal exam     Dental    Pulmonary - normal exam     Abdominal            Anesthesia Plan    History of general anesthesia?: yes  History of complications of general anesthesia?: no    ASA 3     spinal, MAC and regional     The patient is not a current smoker.    intravenous induction   Anesthetic plan and risks discussed with patient.  Use of blood products discussed with patient who.    Plan discussed with CRNA.

## 2024-03-04 NOTE — DISCHARGE INSTRUCTIONS
[facility]                          Caio Ibarra,   Phone: [phone]    POSTOPERATIVE INSTRUCTIONS: TOTAL HIP & TOTAL KNEE ARTHROPLASTY    General/highlights: Flex/tighten the muscles in the buttocks, thighs and calves often when in chair or bed.  Utilize the incentive spirometer often especially the next 3 days.  Walk at least 10 steps every hour that you are awake.  Take stairs 1 at a time, good leg leads up, bed leg legs down, use the handrails.  You may be weightbearing as tolerated, in general the walker is used for 2 weeks.    PAIN, SWELLING & BRUISING  Some pain, stiffness and swelling is normal for up to 1 year after surgery.  Pain will start to let up over time depending on your activity level.  It is preferable to rest for 24 hours following your surgery  Pain is often delayed for 24-48 hours after surgery.  Pain may be dull/achy, throbbing, or even sharp/nerve sensations  It is normal for swelling and bruising to worsen before it gets better.  These symptoms usually peak 1 week after surgery  Swelling and bruising may appear throughout the leg, all the way down to your toes  Wear your compression stockings every day during the day for 14 days and remove them at night.  This will help control swelling    WOUND CARE INSTRUCTIONS  Your surgical bandage will be removed 2 weeks after surgery at your post-operative visit.  If your bandage becomes compromised, begins to come off before then, or soaks through with drainage call the office immediately.  You may have sutures under the skin that dissolve on their own over time.    As the sutures absorb occasionally a small suture abscess can develop, this is not uncommon for up to 6 weeks, if this occurs please notify your surgeon immediately.    HYGIENE  You may shower 24 hours after your surgery, provided the Mepilex silver dressing is in place.  No tub bathing or submerging underwater.  Do not scrub directly over the  surgical bandage  Do not use any creams, lotions or ointments on the surgical leg for 4 weeks after surgery, or until you have been cleared to do so by your surgeon    GENERAL INSTRUCTIONS  Do not drink alcoholic beverages (beer and wine included) for 24 hours following your surgery, or while you are taking narcotic pain medications  Delay making important decisions until you are fully recovered  You cannot swim or submerge in water for at least 6 weeks after surgery, or until you are cleared by your surgeon.  You may start kneeling 3 months after knee replacement surgery, once you have been cleared by your surgeon.  This may not ever feel “normal” or comfortable    HOME DIET  Resume your normal diet after surgery. If you are on a specific type of diet for your condition, resume that instead.    Choose foods that help promote good bowel habits and prevent constipation, such as foods high in fiber.          POSTOPERATIVE MEDICATIONS    Pain medications have been ordered to help manage pain throughout recovery.  While you are using narcotic pain medication, you should be using a stool softener or laxative to prevent constipation.  My preference is parallax powder once or twice a day when taking the narcotic pain medicine  It is important to eat a small meal or snack before taking pain medications to avoid nausea or stomach upset.    MEDICATION REFILLS - [phone]    If you need to request a medication refill, please call the office between 8:30am-4:30pm, Monday through Friday.    Any calls received outside of this timeframe will be handled on the next business day.    Medication requests received on Saturday or Sunday will be handled on Monday.    Please allow 3-5 business days for all medication requests to be processed.    RESTARTING HOME MEDICATIONS  You may restart your home medications the following day after your surgery UNLESS you have been given alternate instructions.    Follow the instructions given to  "you on your hospital discharge instructions for more information regarding your home medications.    ASSISTIVE DEVICE & MOVEMENT  Initially, you will use a walker or crutches to walk for the first 2 weeks.  Once your therapist feels you are ready, you will wean to one crutch or cane followed by no assistive device.  It is important to keep moving throughout recovery.  Walk at least 10 steps every hour that you are awake.  Stairs are part of your recovery, the \"good \"leg leads on the way up, the \"bad \"leg leads on the way down to, use the handrails and not the walker or crutches.  You should be up and walking around several times per day as well as bending your knee and making sure your knee is going completely straight (for knee replacements).  For anterior hip replacements avoid straight leg raise.    NUMBNESS/CLICKING    Decreased sensation or numbness is common on or around the area of the incision due to sensory nerves that are affected at the time of surgery.  The area of numbness will be lateral to the incision  You might always have numbness but the size of the area of numbness should decrease with time.  It is common for patients to have a “click” in the knee with movement.  This is usually nothing to be concerned about.  There are several reasons why your knee can be making these noises, including the implant components rubbing against each other, or a tendons going over a bony prominence.  Grinding can also occur and is not out of the ordinary.  Grinding can be caused by scar tissue formation  Noises will often settle over time once muscle strength improves.    DIFFICULTY SLEEPING    It is very common for patients to have difficulty sleeping at night which can be caused pain, medication, or feeling of anxiety.  Sleep disturbance typically worsens 4-6 weeks after surgery.  You are permitted to sleep on your back or side with a  pillow between your legs for comfort            DRIVING & TRAVEL  Your surgeon " will address this at your post-op appointment.  You must not be taking narcotic pain medication to be cleared to drive.  LEFT LEG JOINT REPLACMENT:  You may drive once you have regained full control of your leg, typically around 2 week after surgery  RIGHT LEG JOINT REPLACEMENT:  You must speak with your surgeon before you resume driving.  Driving can typically be resumed by 4-6 weeks after surgery.  During long distance travel, you should attempt to change position or stand every hour.  You should complete ankle pumps throughout your travel if you are sitting for long periods of time.  If traveling within the first 2 weeks after surgery, you should wear your compression stockings.    DENTAL & OTHER PROCEDURES    All patients must wait a minimum of 3 months for elective procedures, including routine dental cleanings.  For any dental appointment - cleaning or dental procedures - patients must take a prophylactic antibiotic 1 hour before the appointment.  You will also need to call for an antibiotic prior to any other invasive test, procedure, or surgery.  These prophylactic antibiotics will be needed for the rest of your life, in order to prevent infections.  Please call your surgeons office at [phone] to request the antibiotic.      PHYSICAL THERAPY    Following surgery it is important to progress through recovery with in-home or outpatient physical therapy.  You should continue to complete home exercises provided from the hospital on days that you are not working with a physical therapist.    It is common to have a temporary increase in pain and swelling upon starting outpatient physical therapy and/or changing your exercise routine.  Continue to use ice to help with symptoms.     FOLLOW-UP APPOINTMENT - [phone]    Your post-surgical appointments will take place approximately 2 weeks, 6 weeks, 3 months and 1 year following surgery.  Joint replacements are monitored thereafter every 2-5 years for life.  If  you have any questions or need to make changes to this appointment, please contact the office:    EMERGENCIES & WHEN TO CALL YOUR SURGEON  When to contact our office immediately:  Any falls or injury to the joint replacement  Fever >101.5 for at least 48 hours after surgery or chills.  Excessive bleeding from incision(s). A small amount of drainage is normal and expected.  Signs of infection of incision(s)-excessive drainage that is soaking through your dressing (especially if it is pus-like), redness that is spreading out from the edges of your incision, or increased warmth around the area.  Excruciating pain for which the pain medication, taken as instructed, is not helping.  Severe calf pain.  Go directly to the emergency room or call 911, if you are experiencing chest pain or difficulty breathing.              ICE/COLD THERAPY  Ice is most important during the first 2 weeks after surgery, but should be used for several weeks as needed.  Never place ice, or cold therapy devices directly on the skin.  You should always have a protective layer between your skin and the cold.  You have been prescribed to ice your total joint at a minimum of twice per hour for 20 minutes while awake during the first 6 weeks after surgery if you are using ice packs. This will help with pain control.  If you are using an ice machine, please follow ice machine instructions.  After knee replacement is extremely important to elevate the leg straight on an incline, not flat.    COLD THERAPY MACHINE RECOMMENDATIONS      Cold therapy devices can be used before and after surgery to assist in comfort and help to reduce pain and swelling.  These devices differ from ice or ice packs whereas the mechanism circulates water through tubing and a pad to provide longer periods of cold therapy to the desired site.  While in the hospital, you can use your cold devices around the clock for optimal comfort.  We recommend using cold therapy after working  with therapy or completing exercises on your own.  Once you are discharged home, there is no set schedule in which you must follow while using cold therapy.  Below are a few points to remember when using a cold therapy device:    Read the 's instructions prior to first the use.  Follow instructions for filling the cooler (water first, then ice).  Always make sure there is a layer of protection between the cold pad and your skin (Clothing, Towel, Ace Bandage, etc.)  Allow the device to circulate cold water throughout the pad prior wrapping the pad around your leg (approximately 10 minutes).  Place the pad on your leg in the desired position to meet your pain management needs and use the wraps provided to secure the pad to your body.  The purpose of this device is to use consistently throughout the day.  You do not need to need to use the 20 on, 20 off method when using an ice machine.  During waking hours, remove the cold pad every 1-2 hours to perform a skin check  Detach the pad from the cooler and ambulate at least once every hour  After removing the pad, allow at least 30 minutes before resuming cold therapy  You may wear the cold therapy device during periods of sleep including overnight    If you wake up during the night, you can check the skin at this time.  You do not need to wake up specifically to perform skin checks.  Empty the cooler and pad when device is not in use.  Follow 's instructions for cleaning your cold therapy device.    [facility] can assist with problems related to products purchased through the hospital  [phone] - [place]   or   [phone] - [place]    Sample Medication Schedule  [facility]    Medication Refills - [phone] - Monday through Friday 8:30am-4:30pm  Please allow 3-5 days for medication refill requests to be processed.

## 2024-03-04 NOTE — ANESTHESIA POSTPROCEDURE EVALUATION
Patient: Samanta Muñoz    Procedure Summary       Date: 03/04/24 Room / Location: [facility] OR 02 / [facility] OR    Anesthesia Start: 1009 Anesthesia Stop: 1235    Procedure: ARTHROPLASTY  TOTAL KNEE (Left: Knee) Diagnosis:       Primary osteoarthritis of left knee      (Primary osteoarthritis of left knee [M17.12])    Surgeons: Caio Ibarra DO Responsible Provider: MALVIN Horner    Anesthesia Type: spinal, MAC, regional ASA Status: 3            Anesthesia Type: spinal, MAC, regional    Vitals Value Taken Time   /92 03/04/24 1306   Temp 36.2 °C (97.2 °F) 03/04/24 1236   Pulse 79 03/04/24 1306   Resp 18 03/04/24 1306   SpO2 95 % 03/04/24 1306       Anesthesia Post Evaluation    Patient location during evaluation: PACU  Patient participation: complete - patient participated  Level of consciousness: awake  Pain score: 0  Pain management: adequate  Airway patency: patent  Cardiovascular status: acceptable  Respiratory status: acceptable  Hydration status: acceptable  Postoperative Nausea and Vomiting: none        No notable events documented.

## 2024-03-04 NOTE — BRIEF OP NOTE
Date: 3/4/2024  OR Location: [facility] OR    Name: Samanta Muñoz, : 1940, Age: 83 y.o., MRN: [id number], Sex: female    Diagnosis  Pre-op Diagnosis     * Primary osteoarthritis of left knee [M17.12] Post-op Diagnosis     * Primary osteoarthritis of left knee [M17.12]     Procedures  ARTHROPLASTY  TOTAL KNEE  39723 - PA ARTHRP KNE CONDYLE&PLATU MEDIAL&LAT COMPARTMENTS      Surgeons      * Caio Ibarra - Primary    Resident/Fellow/Other Assistant:  Surgeon(s) and Role: Theo Perry PA-C    Procedure Summary  Anesthesia: Regional nerve blocks, spinal, MAC ASA: III  Anesthesia Staff: CRNA: MALVIN Horner  Estimated Blood Loss: 5mL  Intra-op Medications:   Administrations occurring from 1000 to 1230 on 24:   Medication Name Total Dose   sodium chloride 0.9 % irrigation solution 3,000 mL   EPINEPHrine (Adrenalin) 0.2 mL, ketorolac (Toradol) 30 mg, morphine PF (Duramorph) 5 mg, ropivacaine (Naropin) 30 mL in sodium chloride 0.9% 20 mL syringe 56.2 mL              Anesthesia Record               Intraprocedure I/O Totals          Intake    Tranexamic Acid 0.00 mL    The total shown is the total volume documented since Anesthesia Start was filed.    Propofol Drip 0.00 mL    The total shown is the total volume documented since Anesthesia Start was filed.    Total Intake 0 mL          Specimen: No specimens collected     Staff:   Circulator: Mandeep Rizvi RN  Relief Circulator: Marilee Stephenson RN  Scrub Person: Sandra Pereira RN  RNFA: Duran Omer RN          Findings: see dictation    Complications:  None; patient tolerated the procedure well.     Disposition: PACU - hemodynamically stable.  Condition: stable  Specimens Collected: No specimens collected  Attending Attestation: I performed the procedure.    Caio Ibarra  Phone Number: [phone]

## 2024-03-04 NOTE — PROGRESS NOTES
03/04/24 1456   Discharge Planning   Patient expects to be discharged to: Patient plan is to D/C home on 03/04/24 with [facility]. RN/PT/OT services. SOC confirmed for 03/05/24. AVS sent to [facility].

## 2024-03-04 NOTE — DISCHARGE SUMMARY
Discharge Diagnosis  Status post total knee replacement, left    Issues Requiring Follow-Up  L TKA    Test Results Pending At Discharge  Pending Labs       No current pending labs.            Hospital Course   The patient is a pleasant 83 year old female who underwent an elective left total knee arthroplasty performed by Dr. Ibarra at [facility] on 3/4/2024.  Patient had a routine uncomplicated preoperative, intraoperative and immediate postoperative surgical course.  As planned postoperatively the patient was admitted to the regular nursing floor at [facility].  While in the regular nursing floor patient received consultations from both internal medicine as well as physical therapy.  Patient received preoperative and postoperative intravenous antibiotics.  Pain control is with a combination of both oral and IV narcotic and nonnarcotic medications.  DVT prophylaxis was with sequentials early ambulation and aspirin therapy.  Anticoagulation medications will be continued for minimum of 30 days postsurgery.  Patient was discharged home with home physical therapy and home health care outpatient follow-up is being arranged for 2 weeks in the outpatient clinic postdischarge.      Pertinent Physical Exam At Time of Discharge  Physical Exam  Cardiovascular:      Rate and Rhythm: Normal rate.   Pulmonary:      Effort: Pulmonary effort is normal.   Abdominal:      Palpations: Abdomen is soft.   Musculoskeletal:         General: Swelling and tenderness present.      Left lower leg: Edema present.      Comments: PO bandages appreciated on operative side.   Neurological:      Mental Status: She is alert.         Home Medications     Medication List      ASK your doctor about these medications     acetaminophen 500 mg tablet; Commonly known as: Tylenol   atorvastatin 80 mg tablet; Commonly known as: Lipitor   latanoprost 0.005 % ophthalmic solution; Commonly known as: Xalatan   lisinopril 40 mg tablet    oxybutynin XL 15 mg 24 hr tablet; Commonly known as: Ditropan-XL   sertraline 50 mg tablet; Commonly known as: Zoloft       Outpatient Follow-Up  FU at [facility] in 2 weeks, 3/19/2024.    Theo Perry PA-C

## 2024-03-04 NOTE — PROGRESS NOTES
Physical Therapy    Physical Therapy Evaluation & Treatment    Patient Name: Samanta Muñoz  MRN: [id number]  Today's Date: 3/4/2024   Time Calculation  Start Time: 1504  Stop Time: 1546  Time Calculation (min): 42 min    Assessment/Plan   PT Assessment  PT Assessment Results: Decreased strength, Decreased range of motion, Impaired balance, Decreased mobility, Pain (associated with s/p L TKR)  Rehab Prognosis: Good  Evaluation/Treatment Tolerance: Patient limited by pain  Medical Staff Made Aware: Yes  Strengths: Housing layout, Living arrangement secure, Premorbid level of function, Support of extended family/friends, Attitude of self  End of Session Communication: Bedside nurse  Assessment Comment: Pt. has been instructed in safe bed mobility, transfers via walker, ambulation via walker, and stair negotiation in preparation for DC to home this date.  Pt. is currently functioning at SBA to modified IND level via walker for all basic mobilty.  Pt. and dtr demo understanding of safe technique for all basic mobility.  HEP handout given and reviewed for home-going.  Pt. is safe for DC to home with supports in place and [facility]-PT follow-up.  End of Session Patient Position: Up in chair (in wheelchair with RN and dtr present, awaiting DC)   IP OR SWING BED PT PLAN  Inpatient or Swing Bed: Inpatient  PT Plan  Treatment/Interventions: Bed mobility, Transfer training, Gait training, Stair training, Home exercise program  PT Plan:  (same day PT eval and tx)  PT Frequency:  (Same day PT eval and tx)  PT Discharge Recommendations: Low intensity level of continued care  Equipment Recommended upon Discharge: Wheeled walker  PT Recommended Transfer Status: Stand by assist  PT - OK to Discharge: Yes      Subjective     General Visit Information:  General  Reason for Referral: 82 yo female referrred to PT for same say eval/tx following elective L TKR by Dr. Ibarra  Past Medical History Relevant to Rehab: PMH: Arthritis  Depression    Glaucoma   Hyperlipidemia   Hypertension   Macular degeneration  R TKR 17 years ago  Family/Caregiver Present: Yes (Dtr present)  Caregiver Feedback: Dtr provides transportation for pt.  Prior to Session Communication: Bedside nurse  Patient Position Received:  (Sitting EOB no alarm; dtr present)  General Comment: Pt. cleared for PT eval/tx by RN.  Pt. on room air, bilateral compression stockings in place.  Pt. agreeable to therapy eval/tx  Home Living:  Home Living  Type of Home: House  Lives With: Spouse ( has COPD, CHF, on continuous O2, unable to assist pt. physically)  Home Adaptive Equipment: Walker rolling or standard, Cane  Home Layout: One level, Laundry main level, Full bath main level  Home Access: Ramped entrance  Prior Level of Function:  Prior Function Per Pt/Caregiver Report  Level of [place]: Independent with ADLs and functional transfers, Independent with homemaking with ambulation  Receives Help From: Family (for driving to appointments)  ADL Assistance: Independent  Homemaking Assistance: Independent  Ambulatory Assistance: Independent  Precautions:  Precautions  LE Weight Bearing Status: Weight Bearing as Tolerated  Medical Precautions: Fall precautions  Post-Surgical Precautions: Left total knee precautions    Objective   Pain:  Pain Assessment  Pain Assessment: 0-10  Pain Score: 8 (during ambulation)  Pain Type: Acute pain, Surgical pain  Pain Location: Knee  Pain Orientation: Left  Pain Interventions: Repositioned, Cold applied, Other (Comment) (pain management education and handout)  Cognition:  Cognition  Overall Cognitive Status: Within Functional Limits    General Assessments:  Activity Tolerance  Endurance: Endurance does not limit participation in activity  Early Mobility/Exercise Safety Screen: Proceed with mobilization - No exclusion criteria met    Strength  Strength Comments: Able to complete L knee LAQ in sitting and LLE SLR in supine  Strength  Strength Comments: Able  to complete L knee LAQ in sitting and LLE SLR in supine    Static Sitting Balance  Static Sitting-Balance Support: No upper extremity supported, Feet supported  Static Sitting-Level of Assistance: Independent  Dynamic Sitting Balance  Dynamic Sitting-Balance Support: No upper extremity supported, Feet supported  Dynamic Sitting-Balance: Forward lean, Reaching for objects (dressing LE)  Dynamic Sitting-Comments: SBA    Static Standing Balance  Static Standing-Balance Support: Bilateral upper extremity supported  Static Standing-Level of Assistance: Close supervision  Dynamic Standing Balance  Dynamic Standing-Balance Support: No upper extremity supported  Dynamic Standing-Balance: Reaching for objects (managing underwear/pants for dressing)  Dynamic Standing-Comments: SBA with FWW present  Functional Assessments:  Bed Mobility  Bed Mobility: Yes  Bed Mobility 1  Bed Mobility 1: Supine to sitting, Sitting to supine  Level of Assistance 1: Independent  Bed Mobility Comments 1: Able to lift LLE into and out of bed without assist    Transfers  Transfer: Yes  Transfer 1  Transfer From 1: Bed to  Transfer to 1: Stand  Technique 1: Sit to stand, Stand to sit  Transfer Device 1: Walker  Transfer Level of Assistance 1: Close supervision  Trials/Comments 1: Instruction in safe tranfer technique and hand placement to/from various height and type of surfaces; multiple trials to review.  Pt. demo understanding of safe technique and able to complete by end of session    Ambulation/Gait Training  Ambulation/Gait Training Performed: Yes  Ambulation/Gait Training 1  Surface 1: Level tile  Device 1: Rolling walker  Assistance 1: Close supervision  Quality of Gait 1: Inconsistent stride length, Decreased step length, Antalgic  Comments/Distance (ft) 1: 70 ft with FWW, multiple turns, and negotiating tight spaces.  Pt. is steady, safe.  Educated re: progression of gait pattern toward normal fluid movement and how to adjust gait to  manage pain. Pt. and dtr demo understanding.    Stairs  Stairs: Yes  Stairs  Rails 1: Bilateral  Curb Step 1:  (Educated pt. re: how to complete variouys height curb steps with std or wheeled walker.  Pt. verbalized understanding)  Device 1: Railing  Assistance 1:  (Demonstrated stair negotiation for pt. and dtr.  Pt. did not physically complete steps since she has a ramped access into her home.  Pt. and dtr demo understanding of how to complete stair negotiation safely.  Handout given and reviewed)  Extremity/Trunk Assessments:  RUE   RUE : Within Functional Limits  LUE   LUE: Within Functional Limits  RLE   RLE : Within Functional Limits  LLE   LLE : Exceptions to WFL  AROM LLE (degrees)  L Knee Flexion 0-130: -90  Treatments:  Therapeutic Exercise  Therapeutic Exercise Performed: Yes  Therapeutic Exercise Activity 1: Seated AP, LAQ x 5.  Supine SLR x5.  Verbally reviewed SAQ, Quad set, heel slides, and hip abd/add.  Pt. and dtr familiar with exercises from previous participation in PT.  HEP handout given and reviewed.  Pt. verbalized understanding.  Outcome Measures:  [facility] Basic Mobility  Turning from your back to your side while in a flat bed without using bedrails: None  Moving from lying on your back to sitting on the side of a flat bed without using bedrails: None  Moving to and from bed to chair (including a wheelchair): A little  Standing up from a chair using your arms (e.g. wheelchair or bedside chair): A little  To walk in hospital room: A little  Climbing 3-5 steps with railing: A little  Basic Mobility - Total Score: 20    Encounter Problems       Encounter Problems (Active)       Mobility       Pt. and family will demo understanding of safe bed mobility, transfer technique via FWW, gait with FWW, and stair negotiation in preparation for DC to home this date.       Start:  03/04/24    Expected End:  03/04/24                   Education Documentation  Handouts, taught by Tash Beaulieu, PT at  3/4/2024  4:12 PM.  Learner: Patient  Readiness: Eager  Method: Explanation  Response: Verbalizes Understanding    Precautions, taught by Tash Beaulieu, PT at 3/4/2024  4:12 PM.  Learner: Patient  Readiness: Eager  Method: Explanation  Response: Verbalizes Understanding    Body Mechanics, taught by Tash Beaulieu, PT at 3/4/2024  4:12 PM.  Learner: Patient  Readiness: Eager  Method: Explanation  Response: Verbalizes Understanding    Home Exercise Program, taught by Tash Beaulieu, PT at 3/4/2024  4:12 PM.  Learner: Patient  Readiness: Eager  Method: Explanation  Response: Verbalizes Understanding    Mobility Training, taught by Tash Beaulieu, PT at 3/4/2024  4:12 PM.  Learner: Patient  Readiness: Eager  Method: Explanation  Response: Verbalizes Understanding    Education Comments  No comments found.

## 2024-03-04 NOTE — ANESTHESIA PROCEDURE NOTES
Spinal Block    Start time: 3/4/2024 10:15 AM  End time: 3/4/2024 10:21 AM  Reason for block: primary anesthetic  Staffing  Performed: CRNA   Authorized by: MALVIN Horner    Performed by: MALVIN Horner    Preanesthetic Checklist  Completed: patient identified, IV checked, site marked, risks and benefits discussed, surgical consent, monitors and equipment checked, pre-op evaluation, timeout performed and sterile techniques followed  Block Timeout  RN/Licensed healthcare professional reads aloud to the Anesthesia provider and entire team: Patient identity, procedure with side and site, patient position, and as applicable the availability of implants/special equipment/special requirements.  Patient on coagulant treatment: no  Timeout performed at: 3/4/2024 10:15 AM  Spinal Block  Patient position: sitting  Prep: Betadine  Sterility prep: cap, drape, gloves, hand hygiene and mask  Sedation level: light sedation  Patient monitoring: blood pressure, continuous pulse oximetry, ETCO2 and heart rate  Approach: midline  Vertebral space: L4-5  Injection technique: single-shot  Needle  Needle type: pencil-point (pencan)   Needle gauge: 24 G  Needle length: 4 in  Free flowing CSF: yes    Assessment  Block outcome: patient comfortable  Procedure assessment: patient sedated but conversant throughout procedure and patient tolerated procedure well with no immediate complications

## 2024-03-04 NOTE — OP NOTE
ARTHROPLASTY  TOTAL KNEE (L) Operative Note     Date: 3/4/2024  OR Location: A OR    Name: Samanta Muñoz, : 1940, Age: 83 y.o., MRN: [id number], Sex: female    Diagnosis  Pre-op Diagnosis     * Primary osteoarthritis of left knee [M17.12] Post-op Diagnosis     * Primary osteoarthritis of left knee [M17.12]     Procedures  ARTHROPLASTY  TOTAL KNEE  79031 - [place] ARTHRP KNE CONDYLE&PLATU MEDIAL&LAT COMPARTMENTS      Surgeons      * Caio Ibarra - Primary    Resident/Fellow/Other Assistant:  Surgeon(s) and Role: Theo Perry PA-C    Procedure Summary  Anesthesia: Regional nerve blocks, spinal anesthetic with MAC sedation ASA: III  Anesthesia Staff: CRNA: WALDEMAR Horner-CRNA  Estimated Blood Loss: 5 mL  Intra-op Medications:   Administrations occurring from 1000 to 1230 on 24:   Medication Name Total Dose   sodium chloride 0.9 % irrigation solution 3,000 mL   EPINEPHrine (Adrenalin) 0.2 mL, ketorolac (Toradol) 30 mg, morphine PF (Duramorph) 5 mg, ropivacaine (Naropin) 30 mL in sodium chloride 0.9% 20 mL syringe 56.2 mL   lactated Ringer's infusion 173.33 mL              Anesthesia Record               Intraprocedure I/O Totals          Intake    Tranexamic Acid 0.00 mL    The total shown is the total volume documented since Anesthesia Start was filed.    Propofol Drip 0.00 mL    The total shown is the total volume documented since Anesthesia Start was filed.    lactated Ringer's infusion 1600.00 mL    Total Intake 1600 mL       Output    Urine 400 mL    Est. Blood Loss 5 mL    Total Output 405 mL       Net    Net Volume 1195 mL          Specimen: No specimens collected     Staff:   Circulator: Mandeep Rizvi RN  Relief Circulator: Marilee Stephenson RN  Scrub Person: Sandra Pereira RN  RNFA: Duran Omer RN         Drains and/or Catheters: * None in log *    Tourniquet Times:     Total Tourniquet Time Documented:  Leg (Left) - 49 minutes  Total: Leg (Left) - 49 minutes      Implants:  Implants        Type Name Action Serial No.      Joint PATELLA, TRIATHLON, ASYMMETRIC,  SIZE A32 - [id number] Implanted      Joint INSERT, TIBIAL, X3 TRIATHLON CS, SZ-4 9MM - [id number] Implanted      Joint COMPONENT, CR FEMORAL, P4, BEADED W/PA, LEFT - [id number] Implanted      Joint BASEPLATE, TRIATHLON TRITANIUM, SIZE 4, 46MM - [id number] Implanted               Findings: See dictation below    Indications: Samanta Muñoz is an 83 y.o. female who is having surgery for Primary osteoarthritis of left knee [M17.12].  In the form of a left total knee arthroplasty    The patient was seen in the preoperative area. The risks, benefits, complications, treatment options, non-operative alternatives, expected recovery and outcomes were discussed with the patient. The possibilities of reaction to medication, pulmonary aspiration, injury to surrounding structures, bleeding, recurrent infection, the need for additional procedures, failure to diagnose a condition, and creating a complication requiring transfusion or operation were discussed with the patient. The patient concurred with the proposed plan, giving informed consent.  The site of surgery was properly noted/marked if necessary per policy. The patient has been actively warmed in preoperative area. Preoperative antibiotics have been ordered and given within 1 hours of incision. Venous thrombosis prophylaxis have been ordered including unilateral sequential compression device    Procedure Details: Date of surgery: 3/4/2024    Location: [facility]    Pre-Operative Diagnosis: Left knee osteoarthritis    Post-Operative Diagnosis: Left knee osteoarthritis    Procedure: Left total knee arthroplasty    Implants:  #1.  Yessenia triathlon press-fit cruciate retaining femur-size 4  2.  Yessenia triathlon TriTanium press-fit tibial tray-size 4  3.  Yessenia X.3, cruciate stabilized polyethylene-size 4 x 9 mm thickness  4.  Yessenia X.3 TriTanium metal-backed press-fit patella-size  A32    Surgeon: Caio Ibarra DO    First Assistant: Theo Perry PA-C    Intraoperative pathology and findings/operative indications:  Patient is a pleasant 83-year-old female longstanding history of worsening pain to her left knee.  Patient has failed numerous attempts at conservative management.  Pain progressed to the point the great interfered with her quality of life.  She admits to difficulty ambulating more than a short distance without significant pain difficulty going up and down stairs difficulty going from sit to stand.  Physical exam revealed palpable arthritic effusion of the knee pain to palpation medial lateral joint lines antalgic gait.  Range of motion reasonably well-preserved on exam.  Preoperative radiographs revealed bone-on-bone osteoarthritic change bone-on-bone apposition in the medial patellofemoral compartment subchondral cystic changes subchondral sclerosis marginal osteophytic formation.  With continued pain disability related to her left knee patient ultimately chose to move forward with total knee arthroplasty.  At the time of the procedure exam under anesthesia revealed range of motion 0 degrees of extension flexion to roughly 135 degrees mild proximal tibial varus deformity correctable towards neutral.  Intraoperatively she was found to have a arthritic related joint effusion hypertrophic arthritic related synovitis noted throughout the joint mucoid degeneration of the cruciate ligaments was appreciated.  Bone quality reasonably well-preserved without major indications of osteopenia or osteoporosis.  Macerated medial lateral meniscus tissue appreciated full-thickness cartilage loss noted within the medial patellofemoral compartments with master articular cartilage about the lateral compartment.  There was no indication of infection at the time of the procedure.    Procedure in detail:  Patient was correctly identified and marked in preoperative holding risks benefits  alternatives and reasonable expectations for outcomes have previously been discussed.  Also in preoperative holding the patient received a regional block by the Department of Anesthesia.  The patient was escorted to operative suite #4 at [facility], once in the operative suite surgical pause/time-out was performed preoperative antibiotics were administered and spinal anesthetic was provided by the department of anesthesia.  Patient is positioned supine on a standard operative table bony prominences well padded nonsterile tourniquet applied to left proximal thigh.  The left lower extremity was then sterilely prepped and draped in standard fashion.  Anatomic landmarks were identified and marked with sterile marking pen.  Esmarch bandage was applied knee was flexed and tourniquet was raised to 250 millimeters of mercury.  A standard anterior incision was then created with a 10 blade beginning 3 fingerbreadths proximal to the superior pole of the patella carried distally towards the medial aspect of the tibial tubercle.  Careful dissection through the subcutaneous tissues deep to level of Stulberg's fascia was performed creating a medial flap only.  Once the extensor mechanism was adequately exposed a fresh 10 blade was used to make a standard medial parapatellar arthrotomy.  The deep MCL and medial capsule were then elevated off the medial tibial plateau with Bovie cautery and MCL retractor was inserted.  The knee was taken to full extension suprapatellar synovitis was then sharply excised with Bovie cautery.  The patella was everted and the knee was hyperflexed.  Hoffa's fat pad was sharply excised with a 10 blade.  The anterior cruciate ligament was also sacrificed with a 10 blade.  Appropriate collateral retractors were then positioned.  A retrograde Reamer was passed up the femoral canal followed by intramedullary suction.  The long intramedullary guide hollie was then inserted with distal femoral  cutting block set at 8 millimeters of distal femoral resection and 5 degrees of valgus.  The distal femoral cut block was then pinned into place and distal femoral cut created.  Posterior referencing guide was then attached the measurement was obtained the femur sized to a size 4 component.  A size 4, 4 in 1 cut block was then pinned into place and sequential distal femoral cuts were created 1st the anterior cuts and the posterior cuts in the anterior chamber and then the posterior chamfer.  Excess bone and osteophyte was then removed with a curved osteotome and rongeur.  The tibia was subluxed anteriorly medial and lateral meniscus were sharply excised with a 15 blade anterior tibial osteophyte was removed with a rongeur.  The extra medullary tibial clamp was then attached measuring 9 millimeters of resection off the lateral compartment in line with the long axis of the tibia with the cut block set at 0 degrees varus valgus and 3 degrees of posterior slope.  Proximal tibial cut was then performed tibial bone was removed utilizing Bovie cautery for any soft tissue adhesions, the PCL was sacrificed.  Medial tibial plateau osteophyte was then removed with a rongeur.  At this point the knee was taken to 90 degrees of flexion a lamina  was placed between the tibia and the femur and posterior femoral osteophytes were removed with a curved osteotome.  The tibia was once again subluxed anteriorly and sized a size 4 tibial trial was found to be most appropriate this was then provisionally pinned into place with rotation oriented towards the medial 1/3 of the tibial tubercle.  Trial polyethylene was inserted followed by trial femoral component.  The knee was then taken through range of motion varus valgus stability was assessed.  At this point the knee was found to achieve 0 degrees of extension to 142 degrees of flexion and the varus valgus stable at 0 degrees, 30 degrees, 60 degrees no pistoning noted at 90 degrees  and the patella was found to track midline.  The knee was taken to full extension patellae everted native patella thickness was measured at 24 millimeters.  A patellar cutting clamp was then used to resect 10 millimeters of patellar bone 3 lug holes were created through the appropriate guide and a trial polyethylene button was attached.  The knee was again taken through range of motion the patella was again found to track midline.  At this point the trial polyethylene was removed 2 lug holes were created through the distal aspect of the trial femoral component which was then removed and the tibia was again subluxed anteriorly.  The smokestack guide was attached onto the trial tibial tray and a keel punch was passed trial tibial tray was then removed lug hole drill guide for press-fit tibial tray was then inserted 4 lug holes were then drilled.  At this point the knee was taken to full extension and copiously irrigated with sterile saline via Simpulse lavage.  A pericapsular pain injection was then provided.  At this time the knee was once again hyperflexed retractors appropriately positioned final press-fit implants were then impacted into place and found of excellent fixation.  The knee was once again taken into full extension and copiously irrigated with dilute sterile betadine followed by repeat irrigation with sterile saline via Simpulse lavage.  The knee was taken through range of motion stability range of motion were found to be equal to that of the trial components.  At this point the knee was hyperflexed and the tourniquet was dropped at 49 minutes.  Hemostasis was achieved with Bovie cautery.  The knee was then taken into full extension in the medial parapatellar arthrotomy was closed with a 0 Vicryl ligature in interrupted figure-of-eight and running locked fashion.  Deep subcutaneous tissues were closed with 2-O Vicryl ligature in buried interrupted fashion skin was reapproximated with 4 Monocryl in  running subcuticular fashion followed by application of Dermabond.  A self adherent Mepilex Silver dressing was applied applied over top of the wound after the Dermabond was completely dry.  A Thigh high compressive stocking was then applied along with a iceman cooler.  Patient was then woken transferred to a hospital bed and returned to PACU in stable condition.  Counts were correct case was clean elective complications were none, tourniquet time was 49 minutes, estimated blood loss was 5 cc post tourniquet dropped.      Complications:  None; patient tolerated the procedure well.    Disposition: PACU - hemodynamically stable.  Condition: stable         Additional Details:     Attending Attestation: I performed the procedure.    Caio Ibarra  Phone Number: [phone]

## 2024-03-07 ENCOUNTER — HOSPITAL ENCOUNTER (OUTPATIENT)
Facility: HOSPITAL | Age: 84
Setting detail: OBSERVATION
Discharge: HOME | End: 2024-03-08
Attending: STUDENT IN AN ORGANIZED HEALTH CARE EDUCATION/TRAINING PROGRAM | Admitting: INTERNAL MEDICINE
Payer: MEDICARE

## 2024-03-07 ENCOUNTER — APPOINTMENT (OUTPATIENT)
Dept: RADIOLOGY | Facility: HOSPITAL | Age: 84
End: 2024-03-07
Payer: MEDICARE

## 2024-03-07 ENCOUNTER — APPOINTMENT (OUTPATIENT)
Dept: CARDIOLOGY | Facility: HOSPITAL | Age: 84
End: 2024-03-07
Payer: MEDICARE

## 2024-03-07 DIAGNOSIS — R53.1 GENERALIZED WEAKNESS: Primary | ICD-10-CM

## 2024-03-07 DIAGNOSIS — R11.10 VOMITING AND DIARRHEA: ICD-10-CM

## 2024-03-07 DIAGNOSIS — R19.7 VOMITING AND DIARRHEA: ICD-10-CM

## 2024-03-07 DIAGNOSIS — K57.10 DIVERTICULUM OF DUODENUM: ICD-10-CM

## 2024-03-07 LAB
ALBUMIN SERPL BCP-MCNC: 4 G/DL (ref 3.4–5)
ALP SERPL-CCNC: 54 U/L (ref 33–136)
ALT SERPL W P-5'-P-CCNC: 16 U/L (ref 7–45)
ANION GAP SERPL CALC-SCNC: 15 MMOL/L (ref 10–20)
APPEARANCE UR: ABNORMAL
AST SERPL W P-5'-P-CCNC: 25 U/L (ref 9–39)
BASOPHILS # BLD AUTO: 0.04 X10*3/UL (ref 0–0.1)
BASOPHILS NFR BLD AUTO: 0.2 %
BILIRUB SERPL-MCNC: 1 MG/DL (ref 0–1.2)
BILIRUB UR STRIP.AUTO-MCNC: NEGATIVE MG/DL
BNP SERPL-MCNC: 54 PG/ML (ref 0–99)
BUN SERPL-MCNC: 28 MG/DL (ref 6–23)
CALCIUM SERPL-MCNC: 8.9 MG/DL (ref 8.6–10.3)
CARDIAC TROPONIN I PNL SERPL HS: 7 NG/L (ref 0–13)
CHLORIDE SERPL-SCNC: 104 MMOL/L (ref 98–107)
CO2 SERPL-SCNC: 25 MMOL/L (ref 21–32)
COLOR UR: ABNORMAL
CREAT SERPL-MCNC: 1.21 MG/DL (ref 0.5–1.05)
EGFRCR SERPLBLD CKD-EPI 2021: 45 ML/MIN/1.73M*2
EOSINOPHIL # BLD AUTO: 0.06 X10*3/UL (ref 0–0.4)
EOSINOPHIL NFR BLD AUTO: 0.3 %
ERYTHROCYTE [DISTWIDTH] IN BLOOD BY AUTOMATED COUNT: 13.2 % (ref 11.5–14.5)
GLUCOSE SERPL-MCNC: 147 MG/DL (ref 74–99)
GLUCOSE UR STRIP.AUTO-MCNC: NEGATIVE MG/DL
HCT VFR BLD AUTO: 38 % (ref 36–46)
HGB BLD-MCNC: 12.4 G/DL (ref 12–16)
HYALINE CASTS #/AREA URNS AUTO: ABNORMAL /LPF
IMM GRANULOCYTES # BLD AUTO: 0.07 X10*3/UL (ref 0–0.5)
IMM GRANULOCYTES NFR BLD AUTO: 0.4 % (ref 0–0.9)
KETONES UR STRIP.AUTO-MCNC: ABNORMAL MG/DL
LACTATE SERPL-SCNC: 2.3 MMOL/L (ref 0.4–2)
LACTATE SERPL-SCNC: 2.8 MMOL/L (ref 0.4–2)
LEUKOCYTE ESTERASE UR QL STRIP.AUTO: ABNORMAL
LIPASE SERPL-CCNC: 19 U/L (ref 9–82)
LYMPHOCYTES # BLD AUTO: 1.24 X10*3/UL (ref 0.8–3)
LYMPHOCYTES NFR BLD AUTO: 6.8 %
MAGNESIUM SERPL-MCNC: 2.92 MG/DL (ref 1.6–2.4)
MCH RBC QN AUTO: 30.3 PG (ref 26–34)
MCHC RBC AUTO-ENTMCNC: 32.6 G/DL (ref 32–36)
MCV RBC AUTO: 93 FL (ref 80–100)
MONOCYTES # BLD AUTO: 1.01 X10*3/UL (ref 0.05–0.8)
MONOCYTES NFR BLD AUTO: 5.5 %
MUCOUS THREADS #/AREA URNS AUTO: ABNORMAL /LPF
NEUTROPHILS # BLD AUTO: 15.9 X10*3/UL (ref 1.6–5.5)
NEUTROPHILS NFR BLD AUTO: 86.8 %
NITRITE UR QL STRIP.AUTO: NEGATIVE
NRBC BLD-RTO: 0 /100 WBCS (ref 0–0)
PH UR STRIP.AUTO: 5 [PH]
PHOSPHATE SERPL-MCNC: 3.3 MG/DL (ref 2.5–4.9)
PLATELET # BLD AUTO: 164 X10*3/UL (ref 150–450)
POTASSIUM SERPL-SCNC: 4.4 MMOL/L (ref 3.5–5.3)
PROT SERPL-MCNC: 6.7 G/DL (ref 6.4–8.2)
PROT UR STRIP.AUTO-MCNC: NEGATIVE MG/DL
RBC # BLD AUTO: 4.09 X10*6/UL (ref 4–5.2)
RBC # UR STRIP.AUTO: NEGATIVE /UL
RBC #/AREA URNS AUTO: ABNORMAL /HPF
SODIUM SERPL-SCNC: 140 MMOL/L (ref 136–145)
SP GR UR STRIP.AUTO: 1.02
SQUAMOUS #/AREA URNS AUTO: ABNORMAL /HPF
UROBILINOGEN UR STRIP.AUTO-MCNC: 4 MG/DL
WBC # BLD AUTO: 18.3 X10*3/UL (ref 4.4–11.3)
WBC #/AREA URNS AUTO: ABNORMAL /HPF

## 2024-03-07 PROCEDURE — 74177 CT ABD & PELVIS W/CONTRAST: CPT

## 2024-03-07 PROCEDURE — 85025 COMPLETE CBC W/AUTO DIFF WBC: CPT | Performed by: STUDENT IN AN ORGANIZED HEALTH CARE EDUCATION/TRAINING PROGRAM

## 2024-03-07 PROCEDURE — 96376 TX/PRO/DX INJ SAME DRUG ADON: CPT

## 2024-03-07 PROCEDURE — 99285 EMERGENCY DEPT VISIT HI MDM: CPT | Mod: 25

## 2024-03-07 PROCEDURE — 36415 COLL VENOUS BLD VENIPUNCTURE: CPT | Performed by: STUDENT IN AN ORGANIZED HEALTH CARE EDUCATION/TRAINING PROGRAM

## 2024-03-07 PROCEDURE — 71275 CT ANGIOGRAPHY CHEST: CPT | Performed by: STUDENT IN AN ORGANIZED HEALTH CARE EDUCATION/TRAINING PROGRAM

## 2024-03-07 PROCEDURE — 83735 ASSAY OF MAGNESIUM: CPT | Performed by: STUDENT IN AN ORGANIZED HEALTH CARE EDUCATION/TRAINING PROGRAM

## 2024-03-07 PROCEDURE — 87086 URINE CULTURE/COLONY COUNT: CPT | Mod: GENLAB | Performed by: STUDENT IN AN ORGANIZED HEALTH CARE EDUCATION/TRAINING PROGRAM

## 2024-03-07 PROCEDURE — 2550000001 HC RX 255 CONTRASTS: Performed by: STUDENT IN AN ORGANIZED HEALTH CARE EDUCATION/TRAINING PROGRAM

## 2024-03-07 PROCEDURE — 71275 CT ANGIOGRAPHY CHEST: CPT

## 2024-03-07 PROCEDURE — 83880 ASSAY OF NATRIURETIC PEPTIDE: CPT | Performed by: STUDENT IN AN ORGANIZED HEALTH CARE EDUCATION/TRAINING PROGRAM

## 2024-03-07 PROCEDURE — 80053 COMPREHEN METABOLIC PANEL: CPT | Performed by: STUDENT IN AN ORGANIZED HEALTH CARE EDUCATION/TRAINING PROGRAM

## 2024-03-07 PROCEDURE — 2500000004 HC RX 250 GENERAL PHARMACY W/ HCPCS (ALT 636 FOR OP/ED): Performed by: STUDENT IN AN ORGANIZED HEALTH CARE EDUCATION/TRAINING PROGRAM

## 2024-03-07 PROCEDURE — 96375 TX/PRO/DX INJ NEW DRUG ADDON: CPT

## 2024-03-07 PROCEDURE — 96365 THER/PROPH/DIAG IV INF INIT: CPT

## 2024-03-07 PROCEDURE — 83605 ASSAY OF LACTIC ACID: CPT | Performed by: STUDENT IN AN ORGANIZED HEALTH CARE EDUCATION/TRAINING PROGRAM

## 2024-03-07 PROCEDURE — 93005 ELECTROCARDIOGRAM TRACING: CPT

## 2024-03-07 PROCEDURE — 83690 ASSAY OF LIPASE: CPT | Performed by: STUDENT IN AN ORGANIZED HEALTH CARE EDUCATION/TRAINING PROGRAM

## 2024-03-07 PROCEDURE — 74177 CT ABD & PELVIS W/CONTRAST: CPT | Performed by: STUDENT IN AN ORGANIZED HEALTH CARE EDUCATION/TRAINING PROGRAM

## 2024-03-07 PROCEDURE — 81001 URINALYSIS AUTO W/SCOPE: CPT | Performed by: STUDENT IN AN ORGANIZED HEALTH CARE EDUCATION/TRAINING PROGRAM

## 2024-03-07 PROCEDURE — 93971 EXTREMITY STUDY: CPT

## 2024-03-07 PROCEDURE — 84100 ASSAY OF PHOSPHORUS: CPT | Performed by: STUDENT IN AN ORGANIZED HEALTH CARE EDUCATION/TRAINING PROGRAM

## 2024-03-07 PROCEDURE — 96366 THER/PROPH/DIAG IV INF ADDON: CPT

## 2024-03-07 PROCEDURE — 93971 EXTREMITY STUDY: CPT | Mod: FOREIGN READ | Performed by: RADIOLOGY

## 2024-03-07 PROCEDURE — 84484 ASSAY OF TROPONIN QUANT: CPT | Performed by: STUDENT IN AN ORGANIZED HEALTH CARE EDUCATION/TRAINING PROGRAM

## 2024-03-07 RX ORDER — VANCOMYCIN HYDROCHLORIDE 1 G/20ML
INJECTION, POWDER, LYOPHILIZED, FOR SOLUTION INTRAVENOUS
Status: COMPLETED
Start: 2024-03-07 | End: 2024-03-07

## 2024-03-07 RX ORDER — DIPHENHYDRAMINE HYDROCHLORIDE 50 MG/ML
50 INJECTION INTRAMUSCULAR; INTRAVENOUS ONCE
Status: COMPLETED | OUTPATIENT
Start: 2024-03-07 | End: 2024-03-07

## 2024-03-07 RX ADMIN — SODIUM CHLORIDE 1000 ML: 9 INJECTION, SOLUTION INTRAVENOUS at 20:30

## 2024-03-07 RX ADMIN — PIPERACILLIN SODIUM AND TAZOBACTAM SODIUM 4.5 G: 4; .5 INJECTION, SOLUTION INTRAVENOUS at 18:27

## 2024-03-07 RX ADMIN — METHYLPREDNISOLONE SODIUM SUCCINATE 40 MG: 40 INJECTION, POWDER, FOR SOLUTION INTRAMUSCULAR; INTRAVENOUS at 19:36

## 2024-03-07 RX ADMIN — VANCOMYCIN HYDROCHLORIDE 2 G: 1 INJECTION, POWDER, LYOPHILIZED, FOR SOLUTION INTRAVENOUS at 18:25

## 2024-03-07 RX ADMIN — IOHEXOL 75 ML: 350 INJECTION, SOLUTION INTRAVENOUS at 23:36

## 2024-03-07 RX ADMIN — DIPHENHYDRAMINE HYDROCHLORIDE 50 MG: 50 INJECTION INTRAMUSCULAR; INTRAVENOUS at 22:34

## 2024-03-07 RX ADMIN — METHYLPREDNISOLONE SODIUM SUCCINATE 40 MG: 40 INJECTION, POWDER, FOR SOLUTION INTRAMUSCULAR; INTRAVENOUS at 23:15

## 2024-03-07 ASSESSMENT — PAIN SCALES - GENERAL: PAINLEVEL_OUTOF10: 0 - NO PAIN

## 2024-03-07 ASSESSMENT — COLUMBIA-SUICIDE SEVERITY RATING SCALE - C-SSRS
6. HAVE YOU EVER DONE ANYTHING, STARTED TO DO ANYTHING, OR PREPARED TO DO ANYTHING TO END YOUR LIFE?: NO
1. IN THE PAST MONTH, HAVE YOU WISHED YOU WERE DEAD OR WISHED YOU COULD GO TO SLEEP AND NOT WAKE UP?: NO
2. HAVE YOU ACTUALLY HAD ANY THOUGHTS OF KILLING YOURSELF?: NO

## 2024-03-07 ASSESSMENT — PAIN - FUNCTIONAL ASSESSMENT: PAIN_FUNCTIONAL_ASSESSMENT: 0-10

## 2024-03-08 VITALS
WEIGHT: 176.4 LBS | RESPIRATION RATE: 18 BRPM | TEMPERATURE: 98.2 F | HEIGHT: 62 IN | HEART RATE: 71 BPM | BODY MASS INDEX: 32.46 KG/M2 | SYSTOLIC BLOOD PRESSURE: 120 MMHG | DIASTOLIC BLOOD PRESSURE: 63 MMHG | OXYGEN SATURATION: 97 %

## 2024-03-08 PROBLEM — F32.A ANXIETY AND DEPRESSION: Status: ACTIVE | Noted: 2024-03-08

## 2024-03-08 PROBLEM — N32.81 OVERACTIVE BLADDER: Status: ACTIVE | Noted: 2024-03-08

## 2024-03-08 PROBLEM — N17.9 AKI (ACUTE KIDNEY INJURY) (CMS-HCC): Status: ACTIVE | Noted: 2024-03-08

## 2024-03-08 PROBLEM — E78.2 MIXED HYPERLIPIDEMIA: Status: ACTIVE | Noted: 2024-03-08

## 2024-03-08 PROBLEM — I10 PRIMARY HYPERTENSION: Status: ACTIVE | Noted: 2024-03-08

## 2024-03-08 PROBLEM — R53.1 WEAKNESS: Status: ACTIVE | Noted: 2024-03-08

## 2024-03-08 PROBLEM — F41.9 ANXIETY AND DEPRESSION: Status: ACTIVE | Noted: 2024-03-08

## 2024-03-08 LAB
ANION GAP SERPL CALC-SCNC: 12 MMOL/L (ref 10–20)
BACTERIA UR CULT: NORMAL
BUN SERPL-MCNC: 27 MG/DL (ref 6–23)
CALCIUM SERPL-MCNC: 7.9 MG/DL (ref 8.6–10.3)
CHLORIDE SERPL-SCNC: 109 MMOL/L (ref 98–107)
CO2 SERPL-SCNC: 24 MMOL/L (ref 21–32)
CREAT SERPL-MCNC: 1.06 MG/DL (ref 0.5–1.05)
EGFRCR SERPLBLD CKD-EPI 2021: 53 ML/MIN/1.73M*2
ERYTHROCYTE [DISTWIDTH] IN BLOOD BY AUTOMATED COUNT: 13.1 % (ref 11.5–14.5)
FLUAV RNA RESP QL NAA+PROBE: NOT DETECTED
FLUBV RNA RESP QL NAA+PROBE: NOT DETECTED
GLUCOSE SERPL-MCNC: 164 MG/DL (ref 74–99)
HCT VFR BLD AUTO: 33.4 % (ref 36–46)
HGB BLD-MCNC: 10.4 G/DL (ref 12–16)
HOLD SPECIMEN: NORMAL
HOLD SPECIMEN: NORMAL
LACTATE SERPL-SCNC: 0.9 MMOL/L (ref 0.4–2)
MCH RBC QN AUTO: 30.1 PG (ref 26–34)
MCHC RBC AUTO-ENTMCNC: 31.1 G/DL (ref 32–36)
MCV RBC AUTO: 97 FL (ref 80–100)
NRBC BLD-RTO: 0 /100 WBCS (ref 0–0)
PLATELET # BLD AUTO: 142 X10*3/UL (ref 150–450)
POTASSIUM SERPL-SCNC: 4.7 MMOL/L (ref 3.5–5.3)
RBC # BLD AUTO: 3.45 X10*6/UL (ref 4–5.2)
SARS-COV-2 RNA RESP QL NAA+PROBE: NOT DETECTED
SODIUM SERPL-SCNC: 140 MMOL/L (ref 136–145)
WBC # BLD AUTO: 14.6 X10*3/UL (ref 4.4–11.3)

## 2024-03-08 PROCEDURE — 2500000001 HC RX 250 WO HCPCS SELF ADMINISTERED DRUGS (ALT 637 FOR MEDICARE OP)

## 2024-03-08 PROCEDURE — 96367 TX/PROPH/DG ADDL SEQ IV INF: CPT

## 2024-03-08 PROCEDURE — 99222 1ST HOSP IP/OBS MODERATE 55: CPT

## 2024-03-08 PROCEDURE — 2500000004 HC RX 250 GENERAL PHARMACY W/ HCPCS (ALT 636 FOR OP/ED)

## 2024-03-08 PROCEDURE — G0378 HOSPITAL OBSERVATION PER HR: HCPCS

## 2024-03-08 PROCEDURE — 97110 THERAPEUTIC EXERCISES: CPT | Mod: GP | Performed by: PHYSICAL THERAPIST

## 2024-03-08 PROCEDURE — 80048 BASIC METABOLIC PNL TOTAL CA: CPT

## 2024-03-08 PROCEDURE — 36415 COLL VENOUS BLD VENIPUNCTURE: CPT

## 2024-03-08 PROCEDURE — 97165 OT EVAL LOW COMPLEX 30 MIN: CPT | Mod: GO

## 2024-03-08 PROCEDURE — 85027 COMPLETE CBC AUTOMATED: CPT

## 2024-03-08 PROCEDURE — 87636 SARSCOV2 & INF A&B AMP PRB: CPT | Performed by: EMERGENCY MEDICINE

## 2024-03-08 PROCEDURE — 97161 PT EVAL LOW COMPLEX 20 MIN: CPT | Mod: GP | Performed by: PHYSICAL THERAPIST

## 2024-03-08 PROCEDURE — 83605 ASSAY OF LACTIC ACID: CPT

## 2024-03-08 PROCEDURE — 2500000004 HC RX 250 GENERAL PHARMACY W/ HCPCS (ALT 636 FOR OP/ED): Performed by: NURSE PRACTITIONER

## 2024-03-08 PROCEDURE — 2500000002 HC RX 250 W HCPCS SELF ADMINISTERED DRUGS (ALT 637 FOR MEDICARE OP, ALT 636 FOR OP/ED): Mod: MUE

## 2024-03-08 PROCEDURE — 96376 TX/PRO/DX INJ SAME DRUG ADON: CPT

## 2024-03-08 PROCEDURE — 99223 1ST HOSP IP/OBS HIGH 75: CPT

## 2024-03-08 RX ORDER — LISINOPRIL 20 MG/1
40 TABLET ORAL DAILY
Status: DISCONTINUED | OUTPATIENT
Start: 2024-03-08 | End: 2024-03-08 | Stop reason: HOSPADM

## 2024-03-08 RX ORDER — LATANOPROST 50 UG/ML
1 SOLUTION/ DROPS OPHTHALMIC NIGHTLY
Status: DISCONTINUED | OUTPATIENT
Start: 2024-03-08 | End: 2024-03-08 | Stop reason: HOSPADM

## 2024-03-08 RX ORDER — POLYETHYLENE GLYCOL 3350 17 G/17G
17 POWDER, FOR SOLUTION ORAL DAILY PRN
Status: DISCONTINUED | OUTPATIENT
Start: 2024-03-08 | End: 2024-03-08 | Stop reason: HOSPADM

## 2024-03-08 RX ORDER — SERTRALINE HYDROCHLORIDE 50 MG/1
50 TABLET, FILM COATED ORAL DAILY
Status: DISCONTINUED | OUTPATIENT
Start: 2024-03-08 | End: 2024-03-08 | Stop reason: HOSPADM

## 2024-03-08 RX ORDER — DOCUSATE SODIUM 100 MG/1
100 CAPSULE, LIQUID FILLED ORAL 2 TIMES DAILY PRN
Status: DISCONTINUED | OUTPATIENT
Start: 2024-03-08 | End: 2024-03-08 | Stop reason: HOSPADM

## 2024-03-08 RX ORDER — ENOXAPARIN SODIUM 100 MG/ML
40 INJECTION SUBCUTANEOUS EVERY 24 HOURS
Status: DISCONTINUED | OUTPATIENT
Start: 2024-03-08 | End: 2024-03-08 | Stop reason: HOSPADM

## 2024-03-08 RX ORDER — VANCOMYCIN HYDROCHLORIDE 1 G/20ML
INJECTION, POWDER, LYOPHILIZED, FOR SOLUTION INTRAVENOUS DAILY PRN
Status: DISCONTINUED | OUTPATIENT
Start: 2024-03-08 | End: 2024-03-08 | Stop reason: HOSPADM

## 2024-03-08 RX ORDER — VANCOMYCIN 1.75 G/350ML
1250 INJECTION, SOLUTION INTRAVENOUS EVERY 24 HOURS
Status: DISCONTINUED | OUTPATIENT
Start: 2024-03-08 | End: 2024-03-08 | Stop reason: HOSPADM

## 2024-03-08 RX ORDER — ASPIRIN 325 MG
325 TABLET, DELAYED RELEASE (ENTERIC COATED) ORAL 2 TIMES DAILY
Status: DISCONTINUED | OUTPATIENT
Start: 2024-03-08 | End: 2024-03-08 | Stop reason: HOSPADM

## 2024-03-08 RX ORDER — ONDANSETRON 4 MG/1
4 TABLET, FILM COATED ORAL EVERY 6 HOURS PRN
Status: DISCONTINUED | OUTPATIENT
Start: 2024-03-08 | End: 2024-03-08 | Stop reason: HOSPADM

## 2024-03-08 RX ORDER — GABAPENTIN 300 MG/1
300 CAPSULE ORAL 3 TIMES DAILY PRN
Status: DISCONTINUED | OUTPATIENT
Start: 2024-03-08 | End: 2024-03-08 | Stop reason: HOSPADM

## 2024-03-08 RX ORDER — ASCORBIC ACID 500 MG
500 TABLET ORAL 2 TIMES DAILY
Status: DISCONTINUED | OUTPATIENT
Start: 2024-03-08 | End: 2024-03-08 | Stop reason: HOSPADM

## 2024-03-08 RX ORDER — SODIUM CHLORIDE 9 MG/ML
100 INJECTION, SOLUTION INTRAVENOUS CONTINUOUS
Status: DISCONTINUED | OUTPATIENT
Start: 2024-03-08 | End: 2024-03-08 | Stop reason: HOSPADM

## 2024-03-08 RX ORDER — OXYBUTYNIN CHLORIDE 5 MG/1
5 TABLET ORAL 3 TIMES DAILY
Status: DISCONTINUED | OUTPATIENT
Start: 2024-03-08 | End: 2024-03-08 | Stop reason: HOSPADM

## 2024-03-08 RX ORDER — OXYCODONE AND ACETAMINOPHEN 5; 325 MG/1; MG/1
1 TABLET ORAL EVERY 6 HOURS PRN
Status: DISCONTINUED | OUTPATIENT
Start: 2024-03-08 | End: 2024-03-08 | Stop reason: HOSPADM

## 2024-03-08 RX ORDER — PANTOPRAZOLE SODIUM 40 MG/1
40 TABLET, DELAYED RELEASE ORAL 2 TIMES DAILY
Qty: 60 TABLET | Refills: 0 | Status: SHIPPED | OUTPATIENT
Start: 2024-03-08 | End: 2024-04-07

## 2024-03-08 RX ORDER — CARISOPRODOL 350 MG/1
350 TABLET ORAL 3 TIMES DAILY PRN
Status: DISCONTINUED | OUTPATIENT
Start: 2024-03-08 | End: 2024-03-08 | Stop reason: HOSPADM

## 2024-03-08 RX ORDER — CELECOXIB 100 MG/1
200 CAPSULE ORAL 2 TIMES DAILY PRN
Status: DISCONTINUED | OUTPATIENT
Start: 2024-03-08 | End: 2024-03-08 | Stop reason: HOSPADM

## 2024-03-08 RX ORDER — ATORVASTATIN CALCIUM 40 MG/1
80 TABLET, FILM COATED ORAL DAILY
Status: DISCONTINUED | OUTPATIENT
Start: 2024-03-08 | End: 2024-03-08 | Stop reason: HOSPADM

## 2024-03-08 RX ADMIN — ENOXAPARIN SODIUM 40 MG: 40 INJECTION SUBCUTANEOUS at 03:29

## 2024-03-08 RX ADMIN — SERTRALINE HYDROCHLORIDE 50 MG: 50 TABLET ORAL at 09:45

## 2024-03-08 RX ADMIN — PIPERACILLIN SODIUM AND TAZOBACTAM SODIUM 3.38 G: 3; .375 INJECTION, SOLUTION INTRAVENOUS at 09:48

## 2024-03-08 RX ADMIN — SODIUM CHLORIDE 50 ML/HR: 9 INJECTION, SOLUTION INTRAVENOUS at 05:21

## 2024-03-08 RX ADMIN — OXYCODONE HYDROCHLORIDE AND ACETAMINOPHEN 500 MG: 500 TABLET ORAL at 09:45

## 2024-03-08 RX ADMIN — ASPIRIN 325 MG: 325 TABLET, COATED ORAL at 09:45

## 2024-03-08 RX ADMIN — METHYLPREDNISOLONE SODIUM SUCCINATE 40 MG: 40 INJECTION, POWDER, FOR SOLUTION INTRAMUSCULAR; INTRAVENOUS at 03:30

## 2024-03-08 RX ADMIN — OXYBUTYNIN CHLORIDE 5 MG: 5 TABLET ORAL at 09:45

## 2024-03-08 RX ADMIN — LISINOPRIL 40 MG: 20 TABLET ORAL at 09:45

## 2024-03-08 SDOH — ECONOMIC STABILITY: INCOME INSECURITY: IN THE PAST 12 MONTHS, HAS THE ELECTRIC, GAS, OIL, OR WATER COMPANY THREATENED TO SHUT OFF SERVICE IN YOUR HOME?: NO

## 2024-03-08 SDOH — SOCIAL STABILITY: SOCIAL INSECURITY: WITHIN THE LAST YEAR, HAVE YOU BEEN HUMILIATED OR EMOTIONALLY ABUSED IN OTHER WAYS BY YOUR PARTNER OR EX-PARTNER?: NO

## 2024-03-08 SDOH — ECONOMIC STABILITY: FOOD INSECURITY: WITHIN THE PAST 12 MONTHS, THE FOOD YOU BOUGHT JUST DIDN'T LAST AND YOU DIDN'T HAVE MONEY TO GET MORE.: NEVER TRUE

## 2024-03-08 SDOH — SOCIAL STABILITY: SOCIAL NETWORK: ARE YOU MARRIED, WIDOWED, DIVORCED, SEPARATED, NEVER MARRIED, OR LIVING WITH A PARTNER?: MARRIED

## 2024-03-08 SDOH — SOCIAL STABILITY: SOCIAL INSECURITY: WERE YOU ABLE TO COMPLETE ALL THE BEHAVIORAL HEALTH SCREENINGS?: YES

## 2024-03-08 SDOH — HEALTH STABILITY: PHYSICAL HEALTH: ON AVERAGE, HOW MANY DAYS PER WEEK DO YOU ENGAGE IN MODERATE TO STRENUOUS EXERCISE (LIKE A BRISK WALK)?: 0 DAYS

## 2024-03-08 SDOH — SOCIAL STABILITY: SOCIAL INSECURITY: ARE YOU OR HAVE YOU BEEN THREATENED OR ABUSED PHYSICALLY, EMOTIONALLY, OR SEXUALLY BY ANYONE?: NO

## 2024-03-08 SDOH — SOCIAL STABILITY: SOCIAL INSECURITY: HAVE YOU HAD THOUGHTS OF HARMING ANYONE ELSE?: NO

## 2024-03-08 SDOH — HEALTH STABILITY: MENTAL HEALTH
STRESS IS WHEN SOMEONE FEELS TENSE, NERVOUS, ANXIOUS, OR CAN'T SLEEP AT NIGHT BECAUSE THEIR MIND IS TROUBLED. HOW STRESSED ARE YOU?: NOT AT ALL

## 2024-03-08 SDOH — SOCIAL STABILITY: SOCIAL INSECURITY: ABUSE: ADULT

## 2024-03-08 SDOH — SOCIAL STABILITY: SOCIAL INSECURITY: DO YOU FEEL UNSAFE GOING BACK TO THE PLACE WHERE YOU ARE LIVING?: NO

## 2024-03-08 SDOH — ECONOMIC STABILITY: FOOD INSECURITY: WITHIN THE PAST 12 MONTHS, YOU WORRIED THAT YOUR FOOD WOULD RUN OUT BEFORE YOU GOT MONEY TO BUY MORE.: NEVER TRUE

## 2024-03-08 SDOH — SOCIAL STABILITY: SOCIAL NETWORK: HOW OFTEN DO YOU ATTEND CHURCH OR RELIGIOUS SERVICES?: NEVER

## 2024-03-08 SDOH — SOCIAL STABILITY: SOCIAL INSECURITY: WITHIN THE LAST YEAR, HAVE YOU BEEN AFRAID OF YOUR PARTNER OR EX-PARTNER?: NO

## 2024-03-08 SDOH — SOCIAL STABILITY: SOCIAL NETWORK: HOW OFTEN DO YOU GET TOGETHER WITH FRIENDS OR RELATIVES?: MORE THAN THREE TIMES A WEEK

## 2024-03-08 SDOH — SOCIAL STABILITY: SOCIAL INSECURITY: DOES ANYONE TRY TO KEEP YOU FROM HAVING/CONTACTING OTHER FRIENDS OR DOING THINGS OUTSIDE YOUR HOME?: NO

## 2024-03-08 SDOH — SOCIAL STABILITY: SOCIAL INSECURITY: DO YOU FEEL ANYONE HAS EXPLOITED OR TAKEN ADVANTAGE OF YOU FINANCIALLY OR OF YOUR PERSONAL PROPERTY?: NO

## 2024-03-08 SDOH — SOCIAL STABILITY: SOCIAL INSECURITY
WITHIN THE LAST YEAR, HAVE TO BEEN RAPED OR FORCED TO HAVE ANY KIND OF SEXUAL ACTIVITY BY YOUR PARTNER OR EX-PARTNER?: NO

## 2024-03-08 SDOH — SOCIAL STABILITY: SOCIAL INSECURITY
WITHIN THE LAST YEAR, HAVE YOU BEEN KICKED, HIT, SLAPPED, OR OTHERWISE PHYSICALLY HURT BY YOUR PARTNER OR EX-PARTNER?: NO

## 2024-03-08 SDOH — SOCIAL STABILITY: SOCIAL INSECURITY: HAS ANYONE EVER THREATENED TO HURT YOUR FAMILY OR YOUR PETS?: NO

## 2024-03-08 SDOH — HEALTH STABILITY: PHYSICAL HEALTH: ON AVERAGE, HOW MANY MINUTES DO YOU ENGAGE IN EXERCISE AT THIS LEVEL?: 0 MIN

## 2024-03-08 SDOH — SOCIAL STABILITY: SOCIAL NETWORK
DO YOU BELONG TO ANY CLUBS OR ORGANIZATIONS SUCH AS CHURCH GROUPS UNIONS, FRATERNAL OR ATHLETIC GROUPS, OR SCHOOL GROUPS?: NO

## 2024-03-08 SDOH — SOCIAL STABILITY: SOCIAL INSECURITY: ARE THERE ANY APPARENT SIGNS OF INJURIES/BEHAVIORS THAT COULD BE RELATED TO ABUSE/NEGLECT?: NO

## 2024-03-08 SDOH — SOCIAL STABILITY: SOCIAL NETWORK: HOW OFTEN DO YOU ATTENT MEETINGS OF THE CLUB OR ORGANIZATION YOU BELONG TO?: NEVER

## 2024-03-08 SDOH — SOCIAL STABILITY: SOCIAL NETWORK
IN A TYPICAL WEEK, HOW MANY TIMES DO YOU TALK ON THE PHONE WITH FAMILY, FRIENDS, OR NEIGHBORS?: MORE THAN THREE TIMES A WEEK

## 2024-03-08 ASSESSMENT — PAIN SCALES - GENERAL
PAINLEVEL_OUTOF10: 0 - NO PAIN

## 2024-03-08 ASSESSMENT — ENCOUNTER SYMPTOMS
FEVER: 0
WOUND: 1
ROS GI COMMENTS: SEE HPI
JOINT SWELLING: 1
CHILLS: 0
HEMATOLOGIC/LYMPHATIC NEGATIVE: 1
CONSTIPATION: 1
CARDIOVASCULAR NEGATIVE: 1
ACTIVITY CHANGE: 1
WEAKNESS: 1
ABDOMINAL PAIN: 0
ARTHRALGIAS: 1
DIAPHORESIS: 0
NAUSEA: 0
FATIGUE: 1
EYES NEGATIVE: 1
DIARRHEA: 1
ABDOMINAL DISTENTION: 0
VOMITING: 0
APPETITE CHANGE: 0
RESPIRATORY NEGATIVE: 1
FATIGUE: 0
PSYCHIATRIC NEGATIVE: 1
TROUBLE SWALLOWING: 0
RECTAL PAIN: 0
UNEXPECTED WEIGHT CHANGE: 0
DIARRHEA: 0
ENDOCRINE NEGATIVE: 1
ALLERGIC/IMMUNOLOGIC NEGATIVE: 1
ANAL BLEEDING: 0
BLOOD IN STOOL: 0
CONSTIPATION: 0

## 2024-03-08 ASSESSMENT — COGNITIVE AND FUNCTIONAL STATUS - GENERAL
MOVING TO AND FROM BED TO CHAIR: A LITTLE
MOBILITY SCORE: 20
CLIMB 3 TO 5 STEPS WITH RAILING: A LITTLE
DRESSING REGULAR LOWER BODY CLOTHING: A LITTLE
DAILY ACTIVITIY SCORE: 22
PERSONAL GROOMING: A LITTLE
MOVING FROM LYING ON BACK TO SITTING ON SIDE OF FLAT BED WITH BEDRAILS: A LITTLE
TOILETING: A LITTLE
DRESSING REGULAR UPPER BODY CLOTHING: A LITTLE
DAILY ACTIVITIY SCORE: 19
CLIMB 3 TO 5 STEPS WITH RAILING: A LITTLE
MOBILITY SCORE: 18
WALKING IN HOSPITAL ROOM: A LITTLE
HELP NEEDED FOR BATHING: A LITTLE
TURNING FROM BACK TO SIDE WHILE IN FLAT BAD: A LITTLE
STANDING UP FROM CHAIR USING ARMS: A LITTLE
PATIENT BASELINE BEDBOUND: NO
DRESSING REGULAR LOWER BODY CLOTHING: A LITTLE
STANDING UP FROM CHAIR USING ARMS: A LITTLE
HELP NEEDED FOR BATHING: A LITTLE
MOVING TO AND FROM BED TO CHAIR: A LITTLE
WALKING IN HOSPITAL ROOM: A LITTLE

## 2024-03-08 ASSESSMENT — LIFESTYLE VARIABLES
HOW OFTEN DO YOU HAVE A DRINK CONTAINING ALCOHOL: NEVER
HOW OFTEN DO YOU HAVE 6 OR MORE DRINKS ON ONE OCCASION: NEVER
AUDIT-C TOTAL SCORE: 0
SUBSTANCE_ABUSE_PAST_12_MONTHS: NO
PRESCIPTION_ABUSE_PAST_12_MONTHS: NO
HOW MANY STANDARD DRINKS CONTAINING ALCOHOL DO YOU HAVE ON A TYPICAL DAY: PATIENT DOES NOT DRINK
SKIP TO QUESTIONS 9-10: 1
AUDIT-C TOTAL SCORE: 0

## 2024-03-08 ASSESSMENT — ACTIVITIES OF DAILY LIVING (ADL)
HEARING - LEFT EAR: FUNCTIONAL
ASSISTIVE_DEVICE: WALKER
GROOMING: NEEDS ASSISTANCE
BATHING_ASSISTANCE: MODIFIED INDEPENDENT (DEVICE)
JUDGMENT_ADEQUATE_SAFELY_COMPLETE_DAILY_ACTIVITIES: YES
BATHING: NEEDS ASSISTANCE
DRESSING YOURSELF: NEEDS ASSISTANCE
ADL_ASSISTANCE: INDEPENDENT
HEARING - RIGHT EAR: FUNCTIONAL
LACK_OF_TRANSPORTATION: NO
WALKS IN HOME: NEEDS ASSISTANCE
FEEDING YOURSELF: NEEDS ASSISTANCE
TOILETING: NEEDS ASSISTANCE
PATIENT'S MEMORY ADEQUATE TO SAFELY COMPLETE DAILY ACTIVITIES?: YES
ADEQUATE_TO_COMPLETE_ADL: YES

## 2024-03-08 ASSESSMENT — PAIN - FUNCTIONAL ASSESSMENT
PAIN_FUNCTIONAL_ASSESSMENT: 0-10

## 2024-03-08 ASSESSMENT — PATIENT HEALTH QUESTIONNAIRE - PHQ9
2. FEELING DOWN, DEPRESSED OR HOPELESS: NOT AT ALL
1. LITTLE INTEREST OR PLEASURE IN DOING THINGS: NOT AT ALL
SUM OF ALL RESPONSES TO PHQ9 QUESTIONS 1 & 2: 0

## 2024-03-08 NOTE — ED NOTES
This RN assisted pt to bedside commode, pt was unsteady on her feet and only able to take a few steps without assistance, pt initially placed pt for discharge, pt's daughter states she would rather pt stay overnight for observation because pt does not have assistance at home, pt states she would rather go home to this RN but does not want to fall at home, MD agreeable and to try to admit pt     Melissa Middleton RN  03/08/24 0156

## 2024-03-08 NOTE — PROGRESS NOTES
Physical Therapy    Physical Therapy Evaluation & Treatment    Patient Name: Samanta Muñoz  MRN: [id number]  Today's Date: 3/8/2024   Time Calculation  Start Time: 0905  Stop Time: 0920  Time Calculation (min): 15 min    Assessment/Plan   PT Assessment  PT Assessment Results: Decreased strength, Decreased range of motion, Impaired balance, Decreased mobility, Orthopedic restrictions  Rehab Prognosis: Good  Evaluation/Treatment Tolerance: Patient tolerated treatment well  Strengths: Living arrangement secure  Assessment Comment: Pleasant 83 y.o presents with impaired mobility s/p L TKA. Pt. came in with weakness but states this has resolved. Pt. is normally Rudi with SW (after TKA 3/4). Recommend additional PT to address above noted limitations and prevent further decline.  End of Session Patient Position: Up in chair, Alarm off, not on at start of session   IP OR SWING BED PT PLAN  Inpatient or Swing Bed: Inpatient  PT Plan  Treatment/Interventions: Transfer training, Gait training, Balance training, Strengthening, Range of motion, Therapeutic exercise, Therapeutic activity, Home exercise program  PT Plan: Skilled PT  PT Frequency: 3 times per week  PT Discharge Recommendations: Low intensity level of continued care  PT Recommended Transfer Status: Stand by assist  PT - OK to Discharge: Yes Based on completed evaluation and care plan recommendations, no barriers to discharge to next site of care        Subjective     General Visit Information:  General  Reason for Referral: weakness, s/p L TKA 3/4  Referred By: ALESHIA Carcamo  Past Medical History Relevant to Rehab: PMH: OA, L TKA,  depression, mac. degeneration, HTN  Prior to Session Communication: Bedside nurse (OT)  Patient Position Received: Up in chair, Alarm off, not on at start of session  General Comment: matt MIRELES  Home Living:  Home Living  Type of Home: House  Lives With: Spouse  Home Adaptive Equipment:  (standard walker, SC)  Home Layout: One  level  Home Access: Ramped entrance  Bathroom Shower/Tub: Walk-in shower  Bathroom Toilet: Standard  Bathroom Equipment: Grab bars in shower (shower chair)  Prior Level of Function:  Prior Function Per Pt/Caregiver Report  Level of [place]: Independent with ADLs and functional transfers, Independent with homemaking with ambulation  Ambulatory Assistance: Independent (with SW since TKA)  Prior Function Comments: (-) drives  Precautions:  Precautions  Hearing/Visual Limitations: glasses  LE Weight Bearing Status: Weight Bearing as Tolerated  Medical Precautions: Fall precautions  Vital Signs:  Vital Signs  Heart Rate: 75  SpO2: 96 %    Objective   Pain:  Pain Assessment  Pain Assessment: 0-10  Pain Score: 0 - No pain  Cognition:  Cognition  Overall Cognitive Status: Within Functional Limits    General Assessments:     Activity Tolerance  Endurance: Endurance does not limit participation in activity    Sensation  Sensation Comment: denies sensation loss    Strength  Strength Comments: RLE: grossly 4/5; LLE hip and ankle 4/5 knee 4-/5  Coordination  Movements are Fluid and Coordinated: Yes    Static Standing Balance  Static Standing-Comment/Number of Minutes: F+ with WW  Dynamic Standing Balance  Dynamic Standing-Comments: F+ with WW  Functional Assessments:  Transfers  Transfer: Yes  Transfer 1  Transfer Device 1: Walker  Transfer Level of Assistance 1: Distant supervision, Set up    Ambulation/Gait Training  Ambulation/Gait Training Performed: Yes  Ambulation/Gait Training 1  Assistance 1: Distant supervision, Set up  Quality of Gait 1: Diminished heel strike, Decreased step length  Comments/Distance (ft) 1: 75  Extremity/Trunk Assessments:  RLE   RLE : Within Functional Limits  LLE   LLE : Exceptions to WFL  AROM LLE (degrees)  L Knee Extension 0-130:  (approx. -5 degrees from extension)  Treatments:  Therapeutic Exercise  Therapeutic Exercise Performed: Yes  Therapeutic Exercise Activity 1: B ankle pumps 10x2,  LAQ 10x2, knee flexion 10x2, hip flexion 10x2  Outcome Measures:  [facility] Basic Mobility  Turning from your back to your side while in a flat bed without using bedrails: None  Moving from lying on your back to sitting on the side of a flat bed without using bedrails: None  Moving to and from bed to chair (including a wheelchair): A little  Standing up from a chair using your arms (e.g. wheelchair or bedside chair): A little  To walk in hospital room: A little  Climbing 3-5 steps with railing: A little  Basic Mobility - Total Score: 20    Encounter Problems       Encounter Problems (Active)       Balance       STG - Maintains dynamic standing balance with 1  upper extremity support with >=good balance         Start:  03/08/24    Expected End:  03/22/24               Mobility       LTG - Patient will ambulate community distance ADELAIDA with WW       Start:  03/08/24    Expected End:  03/22/24            Pt. will complete HEP IND        Start:  03/08/24    Expected End:  03/22/24               PT Transfers       STG - Patient will transfer sit to and from stand ADELAIDA with SW        Start:  03/08/24    Expected End:  03/22/24               Pain - Adult              Education Documentation      Home Exercise Program, taught by Vilma Manzanares, PT at 3/8/2024 10:37 AM.  Learner: Patient  Readiness: Acceptance  Method: Explanation  Response: Verbalizes Understanding, Needs Reinforcement  Comment: HEP

## 2024-03-08 NOTE — H&P
History Of Present Illness  Samanta Muñoz is a 83 y.o. female presenting with generalized weakness and fatigue.  Patient with left knee replacement 4 days ago.  Patient continues to report fatigue, surgical dressing intact to left knee, no s/s of infection.  Patient reports chronic constipation/diarrhea with no change thus far.  Patient resting in bed, denies pain at this time.       Past Medical History  Past Medical History:   Diagnosis Date    Arthritis     Depression     Glaucoma     Grade 2 out of 6 intensity murmur     Hyperlipidemia     Hypertension     Macular degeneration     Personal history of other diseases of the circulatory system 06/30/2014    History of hypertension    Personal history of other diseases of the circulatory system 06/30/2014    Personal history of cardiac murmur    Personal history of other diseases of the musculoskeletal system and connective tissue 06/30/2014    Personal history of arthritis    Personal history of other infectious and parasitic diseases 06/30/2014    History of mumps    Personal history of other mental and behavioral disorders 06/30/2014    History of depression    Primary osteoarthritis of left knee     Sleep apnea     Unspecified visual disturbance 06/30/2014    Vision disorder       Surgical History  Past Surgical History:   Procedure Laterality Date    APPENDECTOMY  06/30/2014    Appendectomy    BREAST BIOPSY      CATARACT EXTRACTION  06/30/2014    Cataract Surgery    COLONOSCOPY      ESOPHAGOGASTRODUODENOSCOPY      KNEE ARTHROPLASTY      KNEE SURGERY  06/30/2014    Knee Surgery Right        Social History  She reports that she quit smoking about 34 years ago. Her smoking use included cigarettes. She has never used smokeless tobacco. She reports that she does not currently use alcohol. She reports that she does not use drugs.    Family History  Family History   Problem Relation Name Age of Onset    Diabetes Mother      Macular degeneration Father         "  Allergies  Iodinated contrast media    Review of Systems   Constitutional:  Positive for activity change and fatigue.   HENT: Negative.     Eyes: Negative.    Respiratory: Negative.     Cardiovascular: Negative.    Gastrointestinal:  Positive for constipation and diarrhea.   Endocrine: Negative.    Genitourinary: Negative.    Musculoskeletal:  Positive for arthralgias and joint swelling.   Skin: Negative.    Allergic/Immunologic: Negative.    Neurological:  Positive for weakness.   Hematological: Negative.    Psychiatric/Behavioral: Negative.          Physical Exam  Constitutional:       Appearance: She is obese.   HENT:      Head: Normocephalic and atraumatic.      Nose: Nose normal.      Mouth/Throat:      Mouth: Mucous membranes are moist.   Eyes:      Extraocular Movements: Extraocular movements intact.   Cardiovascular:      Rate and Rhythm: Normal rate and regular rhythm.      Pulses: Normal pulses.      Heart sounds: Normal heart sounds.   Pulmonary:      Effort: Pulmonary effort is normal.      Breath sounds: Normal breath sounds.   Abdominal:      General: Bowel sounds are normal.      Palpations: Abdomen is soft.   Musculoskeletal:         General: Normal range of motion.      Cervical back: Normal range of motion.      Left lower leg: Edema present.   Skin:     General: Skin is warm and dry.      Capillary Refill: Capillary refill takes less than 2 seconds.   Neurological:      Mental Status: She is alert and oriented to person, place, and time. Mental status is at baseline.      Motor: Weakness present.      Gait: Gait abnormal.   Psychiatric:         Mood and Affect: Mood normal.         Behavior: Behavior normal.          Last Recorded Vitals  Blood pressure 127/57, pulse 82, temperature 36.9 °C (98.4 °F), temperature source Temporal, resp. rate 18, height 1.575 m (5' 2\"), weight 80 kg (176 lb 6.4 oz), SpO2 90 %.    Relevant Results  Scheduled medications  ascorbic acid, 500 mg, oral, BID  aspirin, " 325 mg, oral, BID  atorvastatin, 80 mg, oral, Daily  enoxaparin, 40 mg, subcutaneous, q24h  latanoprost, 1 drop, Both Eyes, Nightly  lisinopril, 40 mg, oral, Daily  oxybutynin, 5 mg, oral, TID  sertraline, 50 mg, oral, Daily      Continuous medications  sodium chloride 0.9%, 50 mL/hr      PRN medications  PRN medications: carisoprodol, celecoxib, docusate sodium, gabapentin, ondansetron, oxyCODONE-acetaminophen, polyethylene glycol    CT angio chest for pulmonary embolism    Result Date: 3/8/2024  Interpreted By:  Enoc Donahue, STUDY: CT ANGIO CHEST FOR PULMONARY EMBOLISM; CT ABDOMEN PELVIS W IV CONTRAST;  3/7/2024 11:40 pm   INDICATION: Signs/Symptoms:recent knee replacement, here with generalized weakness and SOB; Signs/Symptoms:abdominal cramping and diarrhea, tenderness on exam.   COMPARISON: Chest CT dated 06/20/2008   ACCESSION NUMBER(S): [id number]; [id number]   ORDERING CLINICIAN: TORIE RAMIREZ   TECHNIQUE: Helical data acquisition of the chest was obtained in the arterial phase. Contrast volume: 75 mL IV contrast Omnipaque 350. Subsequently CT of abdomen pelvis was obtained in the late portal venous phase.   Axial contiguous images were reformatted in coronal and sagittal planes. Axial and coronal MIP images were created and reviewed.   FINDINGS: POTENTIAL LIMITATIONS OF THE STUDY: Motion and mixing artifact which limits evaluation of the distal branch vessels.   The visualized thyroid gland is within normal limits.   HEART AND VESSELS: No discrete filling defects within the main pulmonary artery or its branches.   Main pulmonary artery and its branches are normal in caliber.   The thoracic aorta is of normal course and caliber with mild atherosclerotic calcifications.   No coronary artery calcifications are seen.The study is not optimized for evaluation of coronary arteries.   The cardiac chambers are not enlarged.   No evidence of pericardial effusion.   MEDIASTINUM AND MONTRELL, AND AXILLA: No  evidence of thoracic lymphadenopathy by CT criteria.   No mediastinal masses. Mildly patulous esophagus.   LUNGS AND AIRWAYS: The trachea and central airways are patent. No endobronchial lesion.   There is bibasilar left greater than right subsegmental atelectasis. There is no pleural effusion or pneumothorax.   ABDOMEN/PELVIS: Liver: The liver is normal in size and contour. There are no focal hepatic lesions. Gallbladder/biliary system: There are no radiopaque gallstones. There is no intrahepatic or extrahepatic biliary dilatation. Spleen: Normal in size. Pancreas: Not enlarged. No peripancreatic edema or ductal dilatation. No pancreatic mass is identified. Adrenals: Within normal limits. Kidneys: The kidneys are normal in size and contour. There is mild fullness of the renal collecting systems without maurice hydronephrosis. There is also left lower pole parapelvic renal cyst. There is no nephrolithiasis. Urinary bladder: Mildly distended without wall thickening or inflammatory change. Bowel: The stomach is partially collapsed limiting assessment for wall thickening without gross abnormality. No small bowel dilatation or obstruction. Postsurgical changes of ileocolonic anastomosis with formed stool within the proximal colon. The descending colon is mildly distended with liquid stool from the splenic flexure distally with some wall thickening of the distal descending colon, for example coronal image 41 of 124 suggestive of colitis. No obstruction. There is a questionable diverticulum versus ulcer at the posterior gastric fundus, axial image 35 of 163 and coronal image 55 of 124. No associated wall thickening or inflammatory change.       Pelvis: The uterus is present without evidence of adnexal mass.   Vessels: The abdominal aorta is normal in caliber. The portal, mesenteric, and splenic veins are patent. Lymph nodes: No lymphadenopathy in the abdomen or pelvis. Peritoneal/retroperitoneum: There is no evidence of  intraperitoneal free air. There is no retroperitoneal hematoma. There are no pelvic or mesenteric masses identified.   OSSEOUS STRUCTURES: There are no suspicious osseous lesions. Osteopenia. Vertebral body heights are maintained. There is partial lumbarization of S1 with rudimentary S1-S2 disc.       1.  No evidence of pulmonary emboli to the distal segmental level. 2. Bibasilar left greater than right subsegmental atelectasis. 3. Liquid stool throughout the descending colon with mild wall thickening of the distal descending colon suggestive of infectious or inflammatory colitis. 4. Questionable diverticulum versus gastric ulcer within the fundus without associated wall thickening or inflammatory change. Gastroenterology follow-up is recommended     Signed by: Enoc Donahue 3/8/2024 12:34 AM Dictation workstation:   [id number]    CT abdomen pelvis w IV contrast    Result Date: 3/8/2024  Interpreted By:  Enoc Donahue, STUDY: CT ANGIO CHEST FOR PULMONARY EMBOLISM; CT ABDOMEN PELVIS W IV CONTRAST;  3/7/2024 11:40 pm   INDICATION: Signs/Symptoms:recent knee replacement, here with generalized weakness and SOB; Signs/Symptoms:abdominal cramping and diarrhea, tenderness on exam.   COMPARISON: Chest CT dated 06/20/2008   ACCESSION NUMBER(S): [id number]; [id number]   ORDERING CLINICIAN: TORIE RAMIREZ   TECHNIQUE: Helical data acquisition of the chest was obtained in the arterial phase. Contrast volume: 75 mL IV contrast Omnipaque 350. Subsequently CT of abdomen pelvis was obtained in the late portal venous phase.   Axial contiguous images were reformatted in coronal and sagittal planes. Axial and coronal MIP images were created and reviewed.   FINDINGS: POTENTIAL LIMITATIONS OF THE STUDY: Motion and mixing artifact which limits evaluation of the distal branch vessels.   The visualized thyroid gland is within normal limits.   HEART AND VESSELS: No discrete filling defects within the main pulmonary artery or its  branches.   Main pulmonary artery and its branches are normal in caliber.   The thoracic aorta is of normal course and caliber with mild atherosclerotic calcifications.   No coronary artery calcifications are seen.The study is not optimized for evaluation of coronary arteries.   The cardiac chambers are not enlarged.   No evidence of pericardial effusion.   MEDIASTINUM AND MONTRELL, AND AXILLA: No evidence of thoracic lymphadenopathy by CT criteria.   No mediastinal masses. Mildly patulous esophagus.   LUNGS AND AIRWAYS: The trachea and central airways are patent. No endobronchial lesion.   There is bibasilar left greater than right subsegmental atelectasis. There is no pleural effusion or pneumothorax.   ABDOMEN/PELVIS: Liver: The liver is normal in size and contour. There are no focal hepatic lesions. Gallbladder/biliary system: There are no radiopaque gallstones. There is no intrahepatic or extrahepatic biliary dilatation. Spleen: Normal in size. Pancreas: Not enlarged. No peripancreatic edema or ductal dilatation. No pancreatic mass is identified. Adrenals: Within normal limits. Kidneys: The kidneys are normal in size and contour. There is mild fullness of the renal collecting systems without maurice hydronephrosis. There is also left lower pole parapelvic renal cyst. There is no nephrolithiasis. Urinary bladder: Mildly distended without wall thickening or inflammatory change. Bowel: The stomach is partially collapsed limiting assessment for wall thickening without gross abnormality. No small bowel dilatation or obstruction. Postsurgical changes of ileocolonic anastomosis with formed stool within the proximal colon. The descending colon is mildly distended with liquid stool from the splenic flexure distally with some wall thickening of the distal descending colon, for example coronal image 41 of 124 suggestive of colitis. No obstruction. There is a questionable diverticulum versus ulcer at the posterior gastric  fundus, axial image 35 of 163 and coronal image 55 of 124. No associated wall thickening or inflammatory change.       Pelvis: The uterus is present without evidence of adnexal mass.   Vessels: The abdominal aorta is normal in caliber. The portal, mesenteric, and splenic veins are patent. Lymph nodes: No lymphadenopathy in the abdomen or pelvis. Peritoneal/retroperitoneum: There is no evidence of intraperitoneal free air. There is no retroperitoneal hematoma. There are no pelvic or mesenteric masses identified.   OSSEOUS STRUCTURES: There are no suspicious osseous lesions. Osteopenia. Vertebral body heights are maintained. There is partial lumbarization of S1 with rudimentary S1-S2 disc.       1.  No evidence of pulmonary emboli to the distal segmental level. 2. Bibasilar left greater than right subsegmental atelectasis. 3. Liquid stool throughout the descending colon with mild wall thickening of the distal descending colon suggestive of infectious or inflammatory colitis. 4. Questionable diverticulum versus gastric ulcer within the fundus without associated wall thickening or inflammatory change. Gastroenterology follow-up is recommended     Signed by: Enoc Donahue 3/8/2024 12:34 AM Dictation workstation:   [id number]    Lower extremity venous duplex left    Result Date: 3/7/2024  STUDY: Left Lower Extremity Venous Doppler Ultrasound; 6:35 PM INDICATION: Left total knee replacement 03/04/2024.  Mild swelling since surgery. Generalized weakness. COMPARISON: None Available. ACCESSION NUMBER(S): [id number] ORDERING CLINICIAN: TORIE RAMIREZ TECHNIQUE:  Real-time grayscale, color, and spectral doppler ultrasound imaging of the left lower extremity veins was performed. FINDINGS: The left common femoral, femoral, and popliteal veins demonstrated normal compressibility, normal phasic venous flow and normal response to augmentation.  Duplicated femoral vein is noted.  There is no evidence for echogenic thrombi.   The visualized deep calf veins are patent.  There is a small amount of edema within the soft tissues of the left popliteal region, likely due to recent surgery. The contralateral common femoral vein is free of thrombosis.    No evidence for DVT within the left lower extremity. Signed by Deborah June MD    XR knee left 1-2 views    Result Date: 3/4/2024  Interpreted By:  Lynn Lomeli, STUDY: Left knee, two views.   INDICATION: Signs/Symptoms:Post-op knee.   COMPARISON: None.   ACCESSION NUMBER(S): [id number]   ORDERING CLINICIAN: ASH BARRON   FINDINGS: No acute fracture or malalignment. Immediate postsurgical changes of total knee arthroplasty. Hardware is intact without perihardware fractures or lucencies. Surgical drain in the anterior tibia soft tissues. Postsurgical soft tissue gas within the knee joint.       1. Immediate postsurgical changes of total knee arthroplasty without hardware complication.   MACRO: None.   Signed by: Lynn Lomeli 3/4/2024 2:11 PM Dictation workstation:   [id number]    ECG 12 lead    Result Date: 2/20/2024  Sinus bradycardia Left axis deviation Right bundle branch block Abnormal ECG No previous ECGs available    OCT MACULA CIRRUS OD (RIGHT EYE)    Result Date: 2/19/2024  Date of Procedure 2/19/2024. Interpretation Abnormal foveal contour. Findings include Negative for Intraretinal fluid. Interval Change Stable.      Results for orders placed or performed during the hospital encounter of 03/07/24 (from the past 24 hour(s))   CBC and Auto Differential   Result Value Ref Range    WBC 18.3 (H) 4.4 - 11.3 x10*3/uL    nRBC 0.0 0.0 - 0.0 /100 WBCs    RBC 4.09 4.00 - 5.20 x10*6/uL    Hemoglobin 12.4 12.0 - 16.0 g/dL    Hematocrit 38.0 36.0 - 46.0 %    MCV 93 80 - 100 fL    MCH 30.3 26.0 - 34.0 pg    MCHC 32.6 32.0 - 36.0 g/dL    RDW 13.2 11.5 - 14.5 %    Platelets 164 150 - 450 x10*3/uL    Neutrophils % 86.8 40.0 - 80.0 %    Immature Granulocytes %, Automated 0.4 0.0 - 0.9 %     Lymphocytes % 6.8 13.0 - 44.0 %    Monocytes % 5.5 2.0 - 10.0 %    Eosinophils % 0.3 0.0 - 6.0 %    Basophils % 0.2 0.0 - 2.0 %    Neutrophils Absolute 15.90 (H) 1.60 - 5.50 x10*3/uL    Immature Granulocytes Absolute, Automated 0.07 0.00 - 0.50 x10*3/uL    Lymphocytes Absolute 1.24 0.80 - 3.00 x10*3/uL    Monocytes Absolute 1.01 (H) 0.05 - 0.80 x10*3/uL    Eosinophils Absolute 0.06 0.00 - 0.40 x10*3/uL    Basophils Absolute 0.04 0.00 - 0.10 x10*3/uL   Phosphorus   Result Value Ref Range    Phosphorus 3.3 2.5 - 4.9 mg/dL   Comprehensive metabolic panel   Result Value Ref Range    Glucose 147 (H) 74 - 99 mg/dL    Sodium 140 136 - 145 mmol/L    Potassium 4.4 3.5 - 5.3 mmol/L    Chloride 104 98 - 107 mmol/L    Bicarbonate 25 21 - 32 mmol/L    Anion Gap 15 10 - 20 mmol/L    Urea Nitrogen 28 (H) 6 - 23 mg/dL    Creatinine 1.21 (H) 0.50 - 1.05 mg/dL    eGFR 45 (L) >60 mL/min/1.73m*2    Calcium 8.9 8.6 - 10.3 mg/dL    Albumin 4.0 3.4 - 5.0 g/dL    Alkaline Phosphatase 54 33 - 136 U/L    Total Protein 6.7 6.4 - 8.2 g/dL    AST 25 9 - 39 U/L    Bilirubin, Total 1.0 0.0 - 1.2 mg/dL    ALT 16 7 - 45 U/L   Magnesium   Result Value Ref Range    Magnesium 2.92 (H) 1.60 - 2.40 mg/dL   Lipase   Result Value Ref Range    Lipase 19 9 - 82 U/L   Troponin I, High Sensitivity   Result Value Ref Range    Troponin I, High Sensitivity 7 0 - 13 ng/L   B-Type Natriuretic Peptide   Result Value Ref Range    BNP 54 0 - 99 pg/mL   Lactate   Result Value Ref Range    Lactate 2.8 (H) 0.4 - 2.0 mmol/L   Urinalysis with Reflex Culture and Microscopic   Result Value Ref Range    Color, Urine Jackeline (N) Straw, Yellow    Appearance, Urine Hazy (N) Clear    Specific Gravity, Urine 1.016 1.005 - 1.035    pH, Urine 5.0 5.0, 5.5, 6.0, 6.5, 7.0, 7.5, 8.0    Protein, Urine NEGATIVE NEGATIVE mg/dL    Glucose, Urine NEGATIVE NEGATIVE mg/dL    Blood, Urine NEGATIVE NEGATIVE    Ketones, Urine 5 (TRACE) (A) NEGATIVE mg/dL    Bilirubin, Urine NEGATIVE NEGATIVE     Urobilinogen, Urine 4.0 (N) <2.0 mg/dL    Nitrite, Urine NEGATIVE NEGATIVE    Leukocyte Esterase, Urine TRACE (A) NEGATIVE   Microscopic Only, Urine   Result Value Ref Range    WBC, Urine 1-5 1-5, NONE /HPF    RBC, Urine NONE NONE, 1-2, 3-5 /HPF    Squamous Epithelial Cells, Urine 1-9 (SPARSE) Reference range not established. /HPF    Mucus, Urine FEW Reference range not established. /LPF    Hyaline Casts, Urine 1+ (A) NONE /LPF   Lactate   Result Value Ref Range    Lactate 2.3 (H) 0.4 - 2.0 mmol/L   Sars-CoV-2 and Influenza A/B PCR   Result Value Ref Range    Flu A Result Not Detected Not Detected    Flu B Result Not Detected Not Detected    Coronavirus 2019, PCR Not Detected Not Detected        Assessment/Plan   Principal Problem:    Weakness  Active Problems:    Status post total knee replacement, left    Primary hypertension    Mixed hyperlipidemia    Anxiety and depression    ALISON (acute kidney injury) (CMS/HCC)    Overactive bladder      Principal Problem:    #Weakness  -WBC 18.3  -Phosphorus 3.3  -Magnesium 2.92  -Lipase 19  -Lactate 2.8 >2.3  -Covid/Flu Negative  -UA Negative  -PT/OT eval and Treat  -CT chest/abd/pelvis  IMPRESSION:  1.  No evidence of pulmonary emboli to the distal segmental level.  2. Bibasilar left greater than right subsegmental atelectasis.  3. Liquid stool throughout the descending colon with mild wall  thickening of the distal descending colon suggestive of infectious or  inflammatory colitis.  4. Questionable diverticulum versus gastric ulcer within the fundus  without associated wall thickening or inflammatory change.  Gastroenterology follow-up is recommended  -Solu Medrol 40 mg IV given in ED  -Zozyn 4.5 g IV given in ED   -Vanco 2 gm IV given in ED  -GI consulted, appreciate recs    Active Problems:    #Status post total knee replacement, left 3/4  -US LLE  IMPRESSION:  No evidence for DVT within the left lower extremity.  -surgical dressing remains intact      #Primary  hypertension    #Mixed hyperlipidemia  -Troponin 7  -BNP 54  -continue  mg PO BID  -continue Lipitor 80 mg PO Daily   -continue Lisinopril 40 mg PO Daily      #Anxiety and depression  -continue Zoloft 50 mg PO Daily       #ALISON (acute kidney injury) (CMS/[facility])  -BUN/Creat/GFR  28/1.21/45  -NS 1,000 ml IV x 1 in ED  -NS 50 ml /hr      #Overactive Bladder  -Ditropan 5 mg PO TID    DVT Prophylaxis: Lovenox  Fluids: NS @ 50  Nutrition: Regular    Code Status: Full Code    Pt requires inpatient stay at this time.  Total accumulated time spent face to face and not face to face preparing to see the patient, obtaining and reviewing separately obtained history; performing a medically appropriate examination and/or evaluation; counseling and educating the patient, family; ordering medications, tests, or procedures; referring and communicating with other health care professionals; documenting clinical information in the patient's medical record; independently interpreting results and communicating the results to the patient, family; and care coordination was 45 minutes.      WALDEMAR Carcamo-CNP

## 2024-03-08 NOTE — DISCHARGE INSTR - OTHER ORDERS
Thank you for choosing [facility] for your Health Care needs. As you transition from the hospital back to home, we hope we took your preferences into account on how you manage your health needs so you can manage your health at home.     You may receive a survey in the mail within the next couple weeks. Please take the time to complete it and return it. Your input is ALWAYS important to us. Thank you!  Your Care Transition Team - [facility] - [phone]    For questions about your medications listed on your discharge instructions, please call the Nurses Station at [phone].

## 2024-03-08 NOTE — CONSULTS
History Of Present Illness  Samanta Muñoz is a 83 y.o. female who presented to [facility] ED with a chief complaint of generalized weakness.  She had a left knee replacement 4 days prior to admission.  She was having nausea, vomited (denies hematemesis), and had an episode of diarrhea without BRBPR, melena, or hematochezia.  GI consulted for questionable gastric ulcer versus diverticulum found on CT abdomen pelvis.  Today, she is feeling better and would like to go home.  She states that it is not abnormal for her to have diarrhea/constipation.  She denies GERD symptoms.      Past Medical History:   Diagnosis Date    Arthritis     Depression     Glaucoma     Grade 2 out of 6 intensity murmur     Hyperlipidemia     Hypertension     Macular degeneration     Personal history of other diseases of the circulatory system 06/30/2014    History of hypertension    Personal history of other diseases of the circulatory system 06/30/2014    Personal history of cardiac murmur    Personal history of other diseases of the musculoskeletal system and connective tissue 06/30/2014    Personal history of arthritis    Personal history of other infectious and parasitic diseases 06/30/2014    History of mumps    Personal history of other mental and behavioral disorders 06/30/2014    History of depression    Primary osteoarthritis of left knee     Sleep apnea     Unspecified visual disturbance 06/30/2014    Vision disorder       Past Surgical History:   Procedure Laterality Date    APPENDECTOMY  06/30/2014    Appendectomy    BREAST BIOPSY      CATARACT EXTRACTION  06/30/2014    Cataract Surgery    COLONOSCOPY      ESOPHAGOGASTRODUODENOSCOPY      KNEE ARTHROPLASTY      KNEE SURGERY  06/30/2014    Knee Surgery Right        Social history  She reports that she quit smoking about 34 years ago. Her smoking use included cigarettes. She has never used smokeless tobacco. She reports that she does not currently use alcohol. She reports that she  "does not use drugs.    Family History   Problem Relation Name Age of Onset    Diabetes Mother      Macular degeneration Father          Allergies  Iodinated contrast media    Review of Systems   Constitutional:  Negative for appetite change, chills, diaphoresis, fatigue, fever and unexpected weight change.   HENT:  Negative for trouble swallowing.    Cardiovascular:  Positive for leg swelling.   Gastrointestinal:  Negative for abdominal distention, abdominal pain, anal bleeding, blood in stool, constipation, diarrhea, nausea, rectal pain and vomiting.        See HPI   Musculoskeletal:  Positive for joint swelling.   Skin:  Positive for wound.   All other systems reviewed and are negative.      Last Recorded Vitals  Blood pressure 120/63, pulse 71, temperature 36.8 °C (98.2 °F), temperature source Temporal, resp. rate 18, height 1.575 m (5' 2\"), weight 80 kg (176 lb 6.4 oz), SpO2 97 %.    Physical Exam  Constitutional: well nourished, well appearing. NAD. Alert and cooperative  Skin: no jaundice. L knee dressing CDI  Eyes: anicteric, normal conjunctiva  ENT: MMM  Pulmonary: easy and nonlabored on RA  Abdomen: obese, BS+, soft, NT, ND.   MSK: MAEx4  Extremities: LLE edema  Neuro: aaox3. No asterixis.   Psych: appropriate mood and behavior    Intake/Output last 3 Shifts:  I/O last 3 completed shifts:  In: 1674.2 (20.9 mL/kg) [I.V.:74.2 (0.9 mL/kg); IV Piggyback:1600]  Out: 1 (0 mL/kg) [Urine:1 (0 mL/kg/hr)]  Weight: 80 kg      Relevant Results  BMP:  Lab Results   Component Value Date     03/08/2024     03/07/2024     02/20/2024    K 4.7 03/08/2024    K 4.4 03/07/2024    K 4.1 02/20/2024     (H) 03/08/2024     03/07/2024     02/20/2024    CO2 24 03/08/2024    CO2 25 03/07/2024    CO2 26 02/20/2024    BUN 27 (H) 03/08/2024    BUN 28 (H) 03/07/2024    BUN 26 (H) 02/20/2024    CREATININE 1.06 (H) 03/08/2024    CREATININE 1.21 (H) 03/07/2024    CREATININE 0.98 02/20/2024       CBC:  Lab " "Results   Component Value Date    WBC 14.6 (H) 03/08/2024    WBC 18.3 (H) 03/07/2024    WBC 6.0 02/20/2024    RBC 3.45 (L) 03/08/2024    RBC 4.09 03/07/2024    RBC 4.20 02/20/2024    HGB 10.4 (L) 03/08/2024    HGB 12.4 03/07/2024    HGB 12.5 02/20/2024    HCT 33.4 (L) 03/08/2024    HCT 38.0 03/07/2024    HCT 38.4 02/20/2024    MCV 97 03/08/2024    MCV 93 03/07/2024    MCV 91 02/20/2024    MCH 30.1 03/08/2024    MCH 30.3 03/07/2024    MCH 29.8 02/20/2024    MCHC 31.1 (L) 03/08/2024    MCHC 32.6 03/07/2024    MCHC 32.6 02/20/2024    RDW 13.1 03/08/2024    RDW 13.2 03/07/2024    RDW 13.0 02/20/2024     (L) 03/08/2024     03/07/2024     02/20/2024       Lactate Level:  Lab Results   Component Value Date    LACTATE 0.9 03/08/2024    LACTATE 2.3 (H) 03/07/2024    LACTATE 2.8 (H) 03/07/2024       CRP/ESR Level:  No results found for: \"CRP\", \"SEDRATE\"    Hepatic Function Panel:  Lab Results   Component Value Date    ALKPHOS 54 03/07/2024    ALKPHOS 52 02/20/2024    ALT 16 03/07/2024    ALT 14 02/20/2024    AST 25 03/07/2024    AST 15 02/20/2024    PROT 6.7 03/07/2024    PROT 6.3 (L) 02/20/2024    BILITOT 1.0 03/07/2024    BILITOT 0.5 02/20/2024       Magnesium:  Lab Results   Component Value Date    MG 2.92 (H) 03/07/2024       INR:  No results found for: \"PROTIME\", \"INR\"    TSH/T4:  No results found for: \"TSH\"    Lipid Profile:  No results found for: \"CHLPL\", \"TRIG\", \"HDL\", \"LDLCALC\", \"LDLDIRECT\"    Amylase/Lipase:  Lab Results   Component Value Date    LIPASE 19 03/07/2024       Calcium Level  Lab Results   Component Value Date    CALCIUM 7.9 (L) 03/08/2024    CALCIUM 8.9 03/07/2024    CALCIUM 8.8 02/20/2024        Radiology:   CT angio chest for pulmonary embolism   Final Result   1.  No evidence of pulmonary emboli to the distal segmental level.   2. Bibasilar left greater than right subsegmental atelectasis.   3. Liquid stool throughout the descending colon with mild wall   thickening of the distal " descending colon suggestive of infectious or   inflammatory colitis.   4. Questionable diverticulum versus gastric ulcer within the fundus   without associated wall thickening or inflammatory change.   Gastroenterology follow-up is recommended             Signed by: Enoc Donahue 3/8/2024 12:34 AM   Dictation workstation:   [id number]      CT abdomen pelvis w IV contrast   Final Result   1.  No evidence of pulmonary emboli to the distal segmental level.   2. Bibasilar left greater than right subsegmental atelectasis.   3. Liquid stool throughout the descending colon with mild wall   thickening of the distal descending colon suggestive of infectious or   inflammatory colitis.   4. Questionable diverticulum versus gastric ulcer within the fundus   without associated wall thickening or inflammatory change.   Gastroenterology follow-up is recommended             Signed by: Enoc Donahue 3/8/2024 12:34 AM   Dictation workstation:   [id number]      Lower extremity venous duplex left   Final Result   No evidence for DVT within the left lower extremity.   Signed by Deborah June MD         Last EGD 9/28/2016 with Dr. Angela Findings:  ESOPHAGUS: The esophageal mucosa appeared normal   STOMACH: There was mild antral gastritis, with linear erythema extending to the pylorus. Multiple biopsies were obtained from the antrum and the body of the stomach to evaluate for H. pylori infection.   PYLORUS: The pylorus appeared normal.   DUODENUM: The duodenum appeared normal to D2.     EGD 8/3/2016 with Dr. Angela-Findings:  ESOPHAGUS: The esophageal mucosa appeared normal. No evidence of strictures or rings  STOMACH: There was evidence of erosive gastritis in the antrum and body with erythema and edema. Multiple cold biopsies were obtained and negative presence of H. pylori infection.-The gastric cardia below the GE junction there were two small punctate ulcers with black eschar at the base of the ulcers. The mucosa around the ulcer  was biopsied using cold forceps. The ulcers did not have a malignant appearance.   PYLORUS: The pylorus appeared normal.   DUODENUM: The was evidence of edema erythema in the duodenal bulb consistent with duodenitis. The second portion of the duodenum appeared normal.     Last colonoscopy 7/24/2014 with Dr. Brown- FINDINGS: The preparation was adequate. The colon was found to be extremely redundant with multiple loops throughout the right colon secondary to adhesions. There is 1 small polyp noted at the anastomotic area measuring about 0.6 cm in diameter and this was removed using a snare and electrocautery. Apart from the above, the entire colon and the rectum appeared to be free of any other polyps, mass lesions or inflammatory changes. The scope is retroflexed in the distal rectum that revealed  internal hemorrhoids. Digital rectal examination did not reveal any lesions. The withdrawal time was 8 minutes. The patient tolerated the above procedure well. There were no complications.    FINAL DIAGNOSIS  Colon Polyp At Anastomosis, Biopsy  - BENIGN COLONIC MUCOSA WITH A PROMINENT LYMPHOID AGGREGATE.  - NEGATIVE FOR DYSPLASIA.     Assessment/Plan   83 y.o. female on day 0 of admission presenting with Weakness. GI consulted for questionable gastric ulcer versus diverticulum found on CT abdomen pelvis.  Dr. Eugene reviewed images and appears to be a fundic diverticulum.  Colitis appears mild in severity.  Patient currently has no GI symptoms and is requesting to go home.    Start prophylactic PPI therapy while on aspirin  Consider stool studies if diarrhea recurs  No objection to discharge        WALDEMAR Cleveland-CNP

## 2024-03-08 NOTE — ED NOTES
Pt asleep in bed, chest rise and fall even and unlabored, daughter is at bedside, waiting for CT results      Melissa Middleton, RN  03/08/24 0021

## 2024-03-08 NOTE — PROGRESS NOTES
Occupational Therapy    Evaluation    Patient Name: Samanta Muñoz  MRN: [id number]  Today's Date: 3/8/2024  Time Calculation  Start Time: 0852  Stop Time: 0907  Time Calculation (min): 15 min    Assessment  IP OT Assessment  OT Assessment: Pt is a 84 y/o female presenting for a skilled OT evaluation following admission to the hospital for generalized weakness. Pt is displaying baseline functional status and wishes to return home ASAP to resume HH PT. No further OT needs at this time.  Prognosis: Excellent  Barriers to Discharge: None  Evaluation/Treatment Tolerance: Patient tolerated treatment well  End of Session Communication: Bedside nurse  End of Session Patient Position: Up in chair (with PT completing evaluation)  Plan:  Treatment Interventions: ADL retraining, Functional transfer training, Endurance training, Neuromuscular reeducation  OT Frequency: OT eval only  OT Discharge Recommendations: No OT needed after discharge  OT Recommended Transfer Status: Stand by assist, Assist of 1  OT - OK to Discharge: Yes  Based on completed evaluation and care plan recommendations, no barriers to discharge to next site of care.    Subjective   Current Problem:  1. Generalized weakness          General:  General  Reason for Referral: ADLs/safety following genbralized weakness after L knee replacement at home  Referred By: WALDEMAR Carcamo-CNP  Past Medical History Relevant to Rehab: Pt reporting dtr noticing increased weakness and brought pt to ED. (PMH: L knee replacement 4 days ago, depression, glacoma, arthritis, HLD, HTN, macular degeneration, sleep apnea)  Prior to Session Communication: Bedside nurse  Patient Position Received: Up in chair, Alarm off, not on at start of session  General Comment: Pt pleseant and cooperative throughout session. Pt left with all needs in reach following session.    Precautions:  LE Weight Bearing Status: Weight Bearing as Tolerated  Medical Precautions: Fall precautions      Pain:  Pain Assessment  Pain Assessment: 0-10  Pain Score: 0 - No pain    Objective   Cognition:  Overall Cognitive Status: Within Functional Limits     Home Living:  Type of Home: House  Lives With: Spouse  Home Adaptive Equipment: Walker rolling or standard, Cane (FWW and standard)  Home Layout: One level  Home Access: Ramped entrance  Bathroom Shower/Tub: Walk-in shower, Tub/shower unit  Bathroom Toilet: Standard  Bathroom Equipment: Grab bars in shower, Grab bars around toilet, Shower chair with back     Prior Function:  Level of [place]: Independent with ADLs and functional transfers, Independent with homemaking with ambulation  Receives Help From: Family  ADL Assistance: Independent  Homemaking Assistance: Independent  Ambulatory Assistance: Independent (with SW since knee replacement)    IADL History:  Current License: No  Mode of Transportation: Family (dtr drives her to appointments; does not drive d/t macular degeneration)    ADL:  Eating Assistance: Independent  Grooming Assistance: Independent  Bathing Assistance: Modified independent (Device) (using walk in shower and chair)  UE Dressing Assistance: Independent  LE Dressing Assistance: Independent  Toileting Assistance with Device: Independent  ADL Comments: pt reports no increased difficulty with any ADL tasks upon admission to the hospital    Activity Tolerance:  Endurance: Endurance does not limit participation in activity    Bed Mobility/Transfers:   Bed Mobility  Bed Mobility: No    Transfers  Transfer: Yes  Transfer 1  Transfer From 1: Chair with arms to  Transfer to 1: Stand, Sit  Technique 1: Sit to stand, Stand to sit  Transfer Device 1: Walker  Transfer Level of Assistance 1: Independent    Functional Mobility:  Functional Mobility  Functional Mobility Performed: Yes  Functional Mobility 1  Surface 1: Level tile  Device 1: Rolling walker  Assistance 1: Independent  Comments 1: good use of walker and safety    Sitting Balance:  Static  Sitting Balance  Static Sitting-Balance Support: Feet supported, Bilateral upper extremity supported  Static Sitting-Level of Assistance: Independent  Dynamic Sitting Balance  Dynamic Sitting-Balance Support: Feet supported, No upper extremity supported  Dynamic Sitting-Comments: Independent    Standing Balance:  Static Standing Balance  Static Standing-Balance Support: Bilateral upper extremity supported  Static Standing-Level of Assistance: Independent  Dynamic Standing Balance  Dynamic Standing-Balance Support: No upper extremity supported  Dynamic Standing-Comments: distant supervision     IADL's:   Current License: No  Mode of Transportation: Family (dtr drives her to appointments; does not drive d/t macular degeneration)    Vision:   Vision - Basic Assessment  Current Vision: Wears glasses all the time  Visual History: Macular degeneration    Sensation:  Sensation Comment: no sensation deficits noted     Coordination:  Movements are Fluid and Coordinated: Yes     Hand Function:  Hand Function  Gross Grasp: Functional  Coordination: Functional    Extremities:   RUE   RUE : Within Functional Limits and LUE   LUE: Within Functional Limits    Outcome Measures:   [facility] Daily Activity  Putting on and taking off regular lower body clothing: A little  Bathing (including washing, rinsing, drying): A little  Putting on and taking off regular upper body clothing: None  Toileting, which includes using toilet, bedpan or urinal: None  Taking care of personal grooming such as brushing teeth: None  Eating Meals: None  Daily Activity - Total Score: 22    Education Documentation  ADL Training, taught by Ai Malone OT at 3/8/2024 10:54 AM.  Learner: Patient  Readiness: Acceptance  Method: Explanation  Response: Verbalizes Understanding  Comment: use of call bell for needs; proper hand placement with transfers    Education Comments  No comments found.

## 2024-03-08 NOTE — CONSULTS
"Vancomycin Dosing by Pharmacy- INITIAL    Samanta Muñoz is a 83 y.o. year old female who Pharmacy has been consulted for vancomycin dosing for other UNKNOWN . Based on the patient's indication and renal status this patient will be dosed based on a goal AUC of 400-600.     Renal function is currently improving.    Visit Vitals  /55 (BP Location: Left arm, Patient Position: Lying)   Pulse 74   Temp 36.5 °C (97.7 °F) (Temporal)   Resp 18        Lab Results   Component Value Date    CREATININE 1.06 (H) 03/08/2024    CREATININE 1.21 (H) 03/07/2024    CREATININE 0.98 02/20/2024        Patient weight is No results found for: \"PTWEIGHT\"    No results found for: \"CULTURE\"     I/O last 3 completed shifts:  In: 1600 (20 mL/kg) [IV Piggyback:1600]  Out: 1 (0 mL/kg) [Urine:1 (0 mL/kg/hr)]  Weight: 80 kg   [unfilled]    No results found for: \"PATIENTTEMP\"       Assessment/Plan     Patient has already been given a loading dose of 2000 mg.  Will initiate vancomycin maintenance,  1250 mg every 24 hours.    This dosing regimen is predicted by [employer] to result in the following pharmacokinetic parameters:    Regimen: 1250 mg IV every 24 hours.  Start time: 18:25 on 03/08/2024  Exposure target: AUC24 (range)400-600 mg/L.hr   AUC24,ss: 533 mg/L.hr  Probability of AUC24 > 400: 80 %  Ctrough,ss: 16.4 mg/L  Probability of Ctrough,ss > 20: 33 %  Probability of nephrotoxicity (Lodise CHARLIE 2009): 12 %    Follow-up level will be ordered on 3/9 at 0500 unless clinically indicated sooner.  Will continue to monitor renal function daily while on vancomycin and order serum creatinine at least every 48 hours if not already ordered.  Follow for continued vancomycin needs, clinical response, and signs/symptoms of toxicity.       Ania Hameed, PharmD       "

## 2024-03-08 NOTE — ED NOTES
This RN updated pt and daughter on plan of care, pt received IVP solu-medrol and is to receive IVP benadryl prior to CT due to contrast listed as allergy, reaction is listed as hives, pt states she cannot remember what her reaction was, pt is still on cardiac monitor and cont pulse ox      Melissa Middleton RN  03/08/24 0019

## 2024-03-08 NOTE — ED PROVIDER NOTES
Chief Complaint: Generalized weakness  HPI: This is an 83-year-old female, past medical history significant for knee replacement surgery by Dr. Ibarra 4 days ago on the left knee, presenting to the emergency department for generalized weakness and fatigue.  She also complains of left lower extremity swelling, as well as some shortness of breath.  She denies any fevers or chills.  She also complains of an episode of diarrhea as well as nausea.  She denies any vomiting.    Past Medical History:   Diagnosis Date    Arthritis     Depression     Glaucoma     Grade 2 out of 6 intensity murmur     Hyperlipidemia     Hypertension     Macular degeneration     Personal history of other diseases of the circulatory system 06/30/2014    History of hypertension    Personal history of other diseases of the circulatory system 06/30/2014    Personal history of cardiac murmur    Personal history of other diseases of the musculoskeletal system and connective tissue 06/30/2014    Personal history of arthritis    Personal history of other infectious and parasitic diseases 06/30/2014    History of mumps    Personal history of other mental and behavioral disorders 06/30/2014    History of depression    Primary osteoarthritis of left knee     Sleep apnea     Unspecified visual disturbance 06/30/2014    Vision disorder      Past Surgical History:   Procedure Laterality Date    APPENDECTOMY  06/30/2014    Appendectomy    BREAST BIOPSY      CATARACT EXTRACTION  06/30/2014    Cataract Surgery    COLONOSCOPY      ESOPHAGOGASTRODUODENOSCOPY      KNEE ARTHROPLASTY      KNEE SURGERY  06/30/2014    Knee Surgery Right       Physical Exam  Constitutional:       Appearance: Normal appearance.   HENT:      Head: Normocephalic and atraumatic.      Mouth/Throat:      Mouth: Mucous membranes are moist.   Eyes:      Conjunctiva/sclera: Conjunctivae normal.   Cardiovascular:      Rate and Rhythm: Normal rate.   Pulmonary:      Effort: Pulmonary effort is  normal.   Abdominal:      General: Abdomen is flat.      Palpations: Abdomen is soft.   Musculoskeletal:         General: Normal range of motion.      Cervical back: Normal range of motion.      Left lower leg: Edema present.   Skin:     General: Skin is warm and dry.   Neurological:      General: No focal deficit present.      Mental Status: She is alert.   Psychiatric:         Mood and Affect: Mood normal.            ED Course/MDM  ED Course as of 03/07/24 2143   Thu Mar 07, 2024   1918 CT angio chest for pulmonary embolism [KW]      ED Course User Index  [KW] En Rooney MD     EKG interpreted by myself (ED attending physician): Normal sinus rhythm, rate of 78, left axis deviation, normal intervals, nonspecific ST segment changes, nonischemic EKG.    This is a 83 y.o. female presenting to the ED for evaluation of generalized weakness, shortness of breath, fatigue.  Of note, the patient had a knee replacement surgery done 4 days ago by Dr. Ibarra in the right knee.  Patient also complains of episodes of diarrhea today As well as nausea.  She denies any vomiting.  She denies any blood in the stool.  On physical exam, patient is resting comfortably in the bed, no acute distress.  Heart is regular rate and rhythm, lungs are clear to auscultation bilaterally.  There is swelling to the left lower extremity neurovascularly intact anterior knee midline surgical incision is covered with a surgical dressing.  No overlying erythema or significant ecchymosis.  No significant tenderness to palpation.  Lab work was obtained and was concerning for leukocytosis as well as mild ALISON.  Given the lower extremity swelling, recent surgery, and overall symptoms, duplex ultrasound of the left lower extremity was obtained and is negative.  CT PE and CT abdomen pelvis were ordered and are pending at time of signout.  Patient does have a contrast dye allergy, as such a 5-hour prep was initiated prior to signout.  I do anticipate that  the patient will require admission given her overall weakness.  She was signed out in stable condition.      Final Impression  1.  Generalized weakness  Disposition/Plan: Signout  Condition at disposition: Stable.     Chasity Page DO  Emergency Medicine Physician     Chasity Page DO  03/07/24 3989

## 2024-03-08 NOTE — ED NOTES
This RN assumed care from Jase JOYCE, pt is currently getting Zosyn IV, this RN assisted pt to bedside commode to collect urine sample, pt was able to transfer and take a few steps independently, now back in bed on cardiac monitor and cont pulse ox, daughter is at bedside     Melissa Middleton RN  03/08/24 0017

## 2024-03-08 NOTE — PROGRESS NOTES
03/08/24 1242   Discharge Planning   Living Arrangements Spouse/significant other   Support Systems Spouse/significant other;Children;Family members   Assistance Needed At baseline she is independent in ADL's and iADL's. She recenlty had left knee surgery and started services with [facility] - RN,PT/OT, HHA and plans on resuming services on discharge. She is currently observation, no need for new orders. The nurse and PTA will be out to see her today.  (DME: walker, shower chair, grab bars in the shower and around the toilet.)   Type of Residence Private residence  (ranch style single family home)   Number of Stairs to Enter Residence 1  (with ramp)   Number of Stairs Within Residence 0   Do you have animals or pets at home? Yes  (1 dog)   Who is requesting discharge planning? Provider   Home or Post Acute Services In home services   Type of Home Care Services Home health aide;Home nursing visits;Home OT;Home PT   Patient expects to be discharged to: [facility] - resumed   Does the patient need discharge transport arranged? No  (her daughter will provide transportation)   Patient Choice   Patient / Family choosing to utilize agency / facility established prior to hospitalization Yes     Plan for discharge today.   DC Secure

## 2024-03-08 NOTE — DISCHARGE INSTRUCTIONS
Take pantoprazole while on oral aspirin  Follow up as needed with PCP  GI specialist: Barbie Cox, [phone], call office with any questions or concerns  Continue with PT/OT

## 2024-03-08 NOTE — CARE PLAN
The patient's goals for the shift include  rest to promote healing    The clinical goals for the shift include patient will have a decrease in weakness this shift    Over the shift, the patient remained safe and VSS. Admitted to [facility]. No c/o pain, Ice pack applied to L knee. Walked to bathroom with 1 assist with walker. Placed on 2 L NC, d/t decrease in SpO2. IV fluids started.

## 2024-03-08 NOTE — CARE PLAN
Problem: Skin  Goal: Participates in plan/prevention/treatment measures  Flowsheets (Taken 3/8/2024 1244)  Participates in plan/prevention/treatment measures: Increase activity/out of bed for meals   The patient's goals for the shift include      The clinical goals for the shift include will remain without injury    Goal met. Vss. Patient discharging to home.

## 2024-03-08 NOTE — PROGRESS NOTES
"Emergency Medicine Transition of Care Note.    I received Samanta Muñoz in signout from   Dr. Page please see the previous ED provider note for all HPI, PE and MDM up to the time of signout at 1900. This is in addition to the primary record.    In brief Samanta Muñoz is an 83 y.o. female presenting for   Chief Complaint   Patient presents with    Weakness, Gen     Pt states having weakness starting a few hours PTA. Pt had recent knee surgery and has been using walker at home. Pt denies any CP/SOB     At the time of signout we were awaiting: Results of CT to rule out PE.    ED Course as of 03/10/24 1553   Thu Mar 07, 2024   1918 CT angio chest for pulmonary embolism [KW]   2302 Bicarbonate: 25 [KW]   Fri Mar 08, 2024   0102 CT angio chest for pulmonary embolism [KW]      ED Course User Index  [KW] En Rooney MD         Diagnoses as of 03/10/24 1553   Generalized weakness       Medical Decision Making  Patient had a knee replacement a couple weeks ago and was having some pain in the knee and some in the chest so we had ordered a CT of chest angio but she is allergic to contrast manifested by rash so we have premedicated with Benadryl and Solu-Medrol and are waiting the requisite 4 hours before the scan.    Ultrasound showed no clot in the legs.  CT showed no PE but does show some colitis.  Patient wants to go home tonight but daughter is going to talk to her and see if she cannot stay just overnight so that she does not have another \".\" the spell apparently was lightheadedness while she was in the bathroom.  Patient states that she feels fine right now.        Final diagnoses:   None           Procedure  Procedures    En Rooney MD   "

## 2024-03-08 NOTE — DISCHARGE SUMMARY
Discharge Diagnosis  Weakness    Discharge Meds     Your medication list        ASK your doctor about these medications        Instructions Last Dose Given Next Dose Due   ascorbic acid 500 mg tablet  Commonly known as: Vitamin C           aspirin 325 mg EC tablet           atorvastatin 80 mg tablet  Commonly known as: Lipitor           carisoprodol 350 mg tablet  Commonly known as: Soma           celecoxib 200 mg capsule  Commonly known as: CeleBREX           docusate sodium 100 mg capsule  Commonly known as: Colace           gabapentin 300 mg capsule  Commonly known as: Neurontin           latanoprost 0.005 % ophthalmic solution  Commonly known as: Xalatan           lisinopril 40 mg tablet           ondansetron 4 mg tablet  Commonly known as: Zofran      Take 1 tablet (4 mg) by mouth every 6 hours if needed for nausea or vomiting.       oxybutynin XL 15 mg 24 hr tablet  Commonly known as: Ditropan-XL           oxyCODONE-acetaminophen 5-325 mg tablet  Commonly known as: Percocet           sertraline 50 mg tablet  Commonly known as: Zoloft                    Test Results Pending At Discharge  Pending Labs       Order Current Status    Urine Culture In process          Hospital Course   Samanta Muñoz is a 83 y.o. female presenting with generalized weakness and fatigue.  Patient with left knee replacement 4 days ago.  Patient continues to report fatigue, surgical dressing intact to left knee, no s/s of infection.  Patient reports chronic constipation/diarrhea with no change thus far.  Patient resting in bed, denies pain at this time.       Past Medical History  Medical History        Past Medical History:   Diagnosis Date    Arthritis      Depression      Glaucoma      Grade 2 out of 6 intensity murmur      Hyperlipidemia      Hypertension      Macular degeneration      Personal history of other diseases of the circulatory system 06/30/2014     History of hypertension    Personal history of other diseases of the  circulatory system 06/30/2014     Personal history of cardiac murmur    Personal history of other diseases of the musculoskeletal system and connective tissue 06/30/2014     Personal history of arthritis    Personal history of other infectious and parasitic diseases 06/30/2014     History of mumps    Personal history of other mental and behavioral disorders 06/30/2014     History of depression    Primary osteoarthritis of left knee      Sleep apnea      Unspecified visual disturbance 06/30/2014     Vision disorder            Surgical History  Surgical History         Past Surgical History:   Procedure Laterality Date    APPENDECTOMY   06/30/2014     Appendectomy    BREAST BIOPSY        CATARACT EXTRACTION   06/30/2014     Cataract Surgery    COLONOSCOPY        ESOPHAGOGASTRODUODENOSCOPY        KNEE ARTHROPLASTY        KNEE SURGERY   06/30/2014     Knee Surgery Right          Social History  She reports that she quit smoking about 34 years ago. Her smoking use included cigarettes. She has never used smokeless tobacco. She reports that she does not currently use alcohol. She reports that she does not use drugs.     Family History  Family History          Family History   Problem Relation Name Age of Onset    Diabetes Mother        Macular degeneration Father            Pertinent Physical Exam At Time of Discharge  Physical Exam  Constitutional:       Appearance: She is obese.   HENT:      Head: Normocephalic and atraumatic.      Nose: Nose normal.      Mouth/Throat:      Mouth: Mucous membranes are moist.   Eyes:      Extraocular Movements: Extraocular movements intact.   Cardiovascular:      Rate and Rhythm: Normal rate and regular rhythm.      Pulses: Normal pulses.      Heart sounds: Normal heart sounds.   Pulmonary:      Effort: Pulmonary effort is normal.      Breath sounds: Normal breath sounds.   Abdominal:      General: Bowel sounds are normal.      Palpations: Abdomen is soft.   Musculoskeletal:          General: Normal range of motion.      Cervical back: Normal range of motion.      Left lower leg: Edema present.   Skin:     General: Skin is warm and dry.      Capillary Refill: Capillary refill takes less than 2 seconds.   Neurological:      Mental Status: She is alert and oriented to person, place, and time. Mental status is at baseline.      Motor: Weakness present.      Gait: Gait abnormal.   Psychiatric:         Mood and Affect: Mood normal.         Behavior: Behavior normal.     #Weakness  -WBC 18.3  -Phosphorus 3.3  -Magnesium 2.92  -Lipase 19  -Lactate 2.8 >2.3  -Covid/Flu Negative  -UA Negative  -PT/OT eval and Treat  -CT chest/abd/pelvis: 1.  No evidence of pulmonary emboli to the distal segmental level.  2. Bibasilar left greater than right subsegmental atelectasis. 3. Liquid stool throughout the descending colon with mild wall thickening of the distal descending colon suggestive of infectious or inflammatory colitis. 4. Questionable diverticulum versus gastric ulcer within the fundus  without associated wall thickening or inflammatory change. 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  -surgical dressing remains intact  ---follow up with Ortho as directed     #Primary hypertension  #Mixed hyperlipidemia  -Troponin 7  -BNP 54  -continue  mg PO BID  -continue Lipitor 80 mg PO Daily   -continue Lisinopril 40 mg PO Daily  ---Continue all chronic meds  ---Follow up as needed with PCP     #Anxiety and depression  -continue Zoloft 50 mg PO Daily   ---Continue all chronic meds  ---Follow up as needed with PCP     #ALISON (acute kidney injury)  (CMS/[facility])  -BUN/Creat/GFR  28/1.21/45  -NS 1,000 ml IV x 1 in ED  -NS 50 ml /hr  ---resolved     #Overactive Bladder  -Ditropan 5 mg PO TID  ---Continue all chronic meds  ---Follow up as needed with PCP    Stable for discharge.  Total cumulative time spent in preparation of this discharge including documentation review, coordination of care with the medical team including PT/SW/care coordinators and treating consultants, discussion with patient and pertinent family members and finalization of prescriptions, follow-up appointments, and this discharge summary was approximately 45 minutes.     Outpatient Follow-Up  No future appointments.      Nai Turner, APRN-CNP

## 2024-03-08 NOTE — NURSING NOTE
3/8/24 0240: patient arrived to floor from ED to room 238. Patient is AxOx4. No c/o pain. VSS. Silver dressing to operation site on L knee. Daughter Ligia at bedside. Call bernstein in reach, bed alarm on.

## 2024-03-10 LAB
ATRIAL RATE: 54 BPM
ATRIAL RATE: 78 BPM
P AXIS: 50 DEGREES
P AXIS: 75 DEGREES
P OFFSET: 179 MS
P OFFSET: 201 MS
P ONSET: 135 MS
P ONSET: 154 MS
PR INTERVAL: 150 MS
PR INTERVAL: 184 MS
Q ONSET: 227 MS
Q ONSET: 229 MS
QRS COUNT: 13 BEATS
QRS COUNT: 9 BEATS
QRS DURATION: 118 MS
QRS DURATION: 122 MS
QT INTERVAL: 394 MS
QT INTERVAL: 448 MS
QTC CALCULATION(BAZETT): 424 MS
QTC CALCULATION(BAZETT): 449 MS
QTC FREDERICIA: 430 MS
QTC FREDERICIA: 432 MS
R AXIS: -32 DEGREES
R AXIS: -37 DEGREES
T AXIS: 34 DEGREES
T AXIS: 35 DEGREES
T OFFSET: 426 MS
T OFFSET: 451 MS
VENTRICULAR RATE: 54 BPM
VENTRICULAR RATE: 78 BPM

## 2024-03-12 ENCOUNTER — TELEPHONE (OUTPATIENT)
Dept: INPATIENT UNIT | Facility: HOSPITAL | Age: 84
End: 2024-03-12
Payer: MEDICARE

## 2024-03-19 ENCOUNTER — HOSPITAL ENCOUNTER (OUTPATIENT)
Dept: VASCULAR MEDICINE | Facility: HOSPITAL | Age: 84
Discharge: HOME | End: 2024-03-19
Payer: MEDICARE

## 2024-03-19 DIAGNOSIS — M79.662 PAIN IN LEFT LOWER LEG: ICD-10-CM

## 2024-03-19 PROCEDURE — 93971 EXTREMITY STUDY: CPT

## 2024-03-19 PROCEDURE — 93971 EXTREMITY STUDY: CPT | Performed by: SURGERY

## 2025-06-15 ENCOUNTER — APPOINTMENT (OUTPATIENT)
Dept: RADIOLOGY | Facility: HOSPITAL | Age: 85
End: 2025-06-15
Payer: MEDICARE

## 2025-06-15 ENCOUNTER — CLINICAL SUPPORT (OUTPATIENT)
Dept: URGENT CARE | Age: 85
End: 2025-06-15
Payer: MEDICARE

## 2025-06-15 ENCOUNTER — HOSPITAL ENCOUNTER (EMERGENCY)
Facility: HOSPITAL | Age: 85
Discharge: HOME | End: 2025-06-15
Attending: EMERGENCY MEDICINE
Payer: MEDICARE

## 2025-06-15 ENCOUNTER — ANCILLARY PROCEDURE (OUTPATIENT)
Dept: URGENT CARE | Age: 85
End: 2025-06-15
Payer: MEDICARE

## 2025-06-15 VITALS
DIASTOLIC BLOOD PRESSURE: 86 MMHG | OXYGEN SATURATION: 96 % | RESPIRATION RATE: 16 BRPM | WEIGHT: 175 LBS | HEIGHT: 62 IN | TEMPERATURE: 98 F | HEART RATE: 62 BPM | SYSTOLIC BLOOD PRESSURE: 154 MMHG | BODY MASS INDEX: 32.2 KG/M2

## 2025-06-15 VITALS
TEMPERATURE: 97.4 F | RESPIRATION RATE: 16 BRPM | DIASTOLIC BLOOD PRESSURE: 61 MMHG | HEART RATE: 64 BPM | OXYGEN SATURATION: 96 % | SYSTOLIC BLOOD PRESSURE: 136 MMHG | BODY MASS INDEX: 32.2 KG/M2 | HEIGHT: 62 IN | WEIGHT: 175 LBS

## 2025-06-15 DIAGNOSIS — R52 PAIN: ICD-10-CM

## 2025-06-15 DIAGNOSIS — M25.572 PAIN AND SWELLING OF LEFT ANKLE: Primary | ICD-10-CM

## 2025-06-15 DIAGNOSIS — M25.472 PAIN AND SWELLING OF LEFT ANKLE: Primary | ICD-10-CM

## 2025-06-15 DIAGNOSIS — M79.89 LEFT LEG SWELLING: Primary | ICD-10-CM

## 2025-06-15 LAB
ANION GAP SERPL CALC-SCNC: 12 MMOL/L (ref 10–20)
BASOPHILS # BLD AUTO: 0.03 X10*3/UL (ref 0–0.1)
BASOPHILS NFR BLD AUTO: 0.3 %
BUN SERPL-MCNC: 23 MG/DL (ref 6–23)
CALCIUM SERPL-MCNC: 9.5 MG/DL (ref 8.6–10.3)
CHLORIDE SERPL-SCNC: 103 MMOL/L (ref 98–107)
CO2 SERPL-SCNC: 28 MMOL/L (ref 21–32)
CREAT SERPL-MCNC: 1.04 MG/DL (ref 0.5–1.05)
CRP SERPL-MCNC: 1.11 MG/DL
EGFRCR SERPLBLD CKD-EPI 2021: 54 ML/MIN/1.73M*2
EOSINOPHIL # BLD AUTO: 0.09 X10*3/UL (ref 0–0.4)
EOSINOPHIL NFR BLD AUTO: 1 %
ERYTHROCYTE [DISTWIDTH] IN BLOOD BY AUTOMATED COUNT: 13.1 % (ref 11.5–14.5)
GLUCOSE SERPL-MCNC: 139 MG/DL (ref 74–99)
HCT VFR BLD AUTO: 40.1 % (ref 36–46)
HGB BLD-MCNC: 13.3 G/DL (ref 12–16)
IMM GRANULOCYTES # BLD AUTO: 0.02 X10*3/UL (ref 0–0.5)
IMM GRANULOCYTES NFR BLD AUTO: 0.2 % (ref 0–0.9)
LYMPHOCYTES # BLD AUTO: 1.71 X10*3/UL (ref 0.8–3)
LYMPHOCYTES NFR BLD AUTO: 19.6 %
MCH RBC QN AUTO: 29.9 PG (ref 26–34)
MCHC RBC AUTO-ENTMCNC: 33.2 G/DL (ref 32–36)
MCV RBC AUTO: 90 FL (ref 80–100)
MONOCYTES # BLD AUTO: 0.77 X10*3/UL (ref 0.05–0.8)
MONOCYTES NFR BLD AUTO: 8.8 %
NEUTROPHILS # BLD AUTO: 6.12 X10*3/UL (ref 1.6–5.5)
NEUTROPHILS NFR BLD AUTO: 70.1 %
NRBC BLD-RTO: 0 /100 WBCS (ref 0–0)
PLATELET # BLD AUTO: 147 X10*3/UL (ref 150–450)
POTASSIUM SERPL-SCNC: 4.2 MMOL/L (ref 3.5–5.3)
RBC # BLD AUTO: 4.45 X10*6/UL (ref 4–5.2)
SODIUM SERPL-SCNC: 139 MMOL/L (ref 136–145)
URATE SERPL-MCNC: 5.2 MG/DL (ref 2.3–6.7)
WBC # BLD AUTO: 8.7 X10*3/UL (ref 4.4–11.3)

## 2025-06-15 PROCEDURE — 36415 COLL VENOUS BLD VENIPUNCTURE: CPT | Performed by: EMERGENCY MEDICINE

## 2025-06-15 PROCEDURE — 80048 BASIC METABOLIC PNL TOTAL CA: CPT | Performed by: EMERGENCY MEDICINE

## 2025-06-15 PROCEDURE — 99284 EMERGENCY DEPT VISIT MOD MDM: CPT | Mod: 25 | Performed by: EMERGENCY MEDICINE

## 2025-06-15 PROCEDURE — 85025 COMPLETE CBC W/AUTO DIFF WBC: CPT | Performed by: EMERGENCY MEDICINE

## 2025-06-15 PROCEDURE — 73610 X-RAY EXAM OF ANKLE: CPT | Mod: LEFT SIDE

## 2025-06-15 PROCEDURE — 86140 C-REACTIVE PROTEIN: CPT | Performed by: EMERGENCY MEDICINE

## 2025-06-15 PROCEDURE — 93971 EXTREMITY STUDY: CPT | Performed by: STUDENT IN AN ORGANIZED HEALTH CARE EDUCATION/TRAINING PROGRAM

## 2025-06-15 PROCEDURE — 84550 ASSAY OF BLOOD/URIC ACID: CPT | Performed by: EMERGENCY MEDICINE

## 2025-06-15 PROCEDURE — 93971 EXTREMITY STUDY: CPT

## 2025-06-15 RX ORDER — ACETAMINOPHEN 325 MG/1
975 TABLET ORAL ONCE
Status: DISCONTINUED | OUTPATIENT
Start: 2025-06-15 | End: 2025-06-15 | Stop reason: HOSPADM

## 2025-06-15 ASSESSMENT — PATIENT HEALTH QUESTIONNAIRE - PHQ9
SUM OF ALL RESPONSES TO PHQ9 QUESTIONS 1 & 2: 0
1. LITTLE INTEREST OR PLEASURE IN DOING THINGS: NOT AT ALL
2. FEELING DOWN, DEPRESSED OR HOPELESS: NOT AT ALL

## 2025-06-15 ASSESSMENT — PAIN DESCRIPTION - LOCATION: LOCATION: ANKLE

## 2025-06-15 ASSESSMENT — PAIN DESCRIPTION - DESCRIPTORS: DESCRIPTORS: ACHING

## 2025-06-15 ASSESSMENT — PAIN DESCRIPTION - PAIN TYPE: TYPE: ACUTE PAIN

## 2025-06-15 ASSESSMENT — PAIN - FUNCTIONAL ASSESSMENT: PAIN_FUNCTIONAL_ASSESSMENT: 0-10

## 2025-06-15 ASSESSMENT — ENCOUNTER SYMPTOMS
DIZZINESS: 0
FEVER: 0
SHORTNESS OF BREATH: 0
FATIGUE: 0
WEAKNESS: 0
NUMBNESS: 0
COUGH: 0
ARTHRALGIAS: 1
PAIN: 1
CHILLS: 0
COLOR CHANGE: 0
BACK PAIN: 0
JOINT SWELLING: 1

## 2025-06-15 ASSESSMENT — PAIN DESCRIPTION - FREQUENCY: FREQUENCY: CONSTANT/CONTINUOUS

## 2025-06-15 ASSESSMENT — PAIN DESCRIPTION - ORIENTATION: ORIENTATION: LEFT

## 2025-06-15 ASSESSMENT — PAIN SCALES - GENERAL
PAINLEVEL_OUTOF10: 2
PAINLEVEL_OUTOF10: 4
PAINLEVEL_OUTOF10: 9

## 2025-06-15 NOTE — PROGRESS NOTES
Subjective   Patient ID: Samanta Muñoz is a 84 y.o. female. They present today with a chief complaint of Pain (PT having LEFT ankle swelling and pain, no injury. PT states the pain woke her up this morning. Pain is 9/10.).    History of Present Illness  Patient is an 84-year-old female presenting to the clinic with complaints of left lower leg swelling.  She has had the swelling since 3:30 AM.  Patient states the swelling occurred abruptly over the left lower extremity she has swelling over the shin as well as left ankle no known injury.  No numbness or tingling or weakness.  No associated redness.  No calf pain.  No recent hospitalizations.  No recent long travel or surgeries per patient.  Patient states she developed abrupt swelling left lower leg.  Patient is concerned about blood clot.      History provided by:  Patient  Pain  Associated symptoms: no chest pain, no cough, no fatigue, no fever, no rash and no shortness of breath        Past Medical History  Allergies as of 06/15/2025 - Reviewed 06/15/2025   Allergen Reaction Noted    Dye Anaphylaxis 10/19/2023    Povidone-iodine Anaphylaxis 09/12/2023    Iodinated contrast media Rash 02/20/2024       Prescriptions Prior to Admission[1]     Medical History[2]    Surgical History[3]     reports that she quit smoking about 35 years ago. Her smoking use included cigarettes. She has never used smokeless tobacco. She reports that she does not currently use alcohol. She reports that she does not use drugs.    Review of Systems  Review of Systems   Constitutional:  Negative for chills, fatigue and fever.   Respiratory:  Negative for cough and shortness of breath.    Cardiovascular:  Negative for chest pain.   Musculoskeletal:  Positive for arthralgias and joint swelling. Negative for back pain.   Skin:  Negative for color change and rash.   Neurological:  Negative for dizziness, weakness and numbness.                                  Objective    Vitals:    06/15/25  "1024   BP: 136/61   Pulse: 64   Resp: 16   Temp: 36.3 °C (97.4 °F)   TempSrc: Oral   SpO2: 96%   Weight: 79.4 kg (175 lb)   Height: 1.575 m (5' 2\")     No LMP recorded (lmp unknown). Patient is postmenopausal.    Physical Exam  Vitals reviewed.   Constitutional:       General: She is not in acute distress.     Appearance: Normal appearance. She is not ill-appearing or toxic-appearing.   HENT:      Head: Normocephalic and atraumatic.   Cardiovascular:      Rate and Rhythm: Normal rate and regular rhythm.      Pulses:           Dorsalis pedis pulses are 2+ on the left side.        Posterior tibial pulses are 2+ on the left side.      Heart sounds: Normal heart sounds. No murmur heard.     No friction rub. No gallop.   Pulmonary:      Effort: Pulmonary effort is normal. No respiratory distress.      Breath sounds: Normal breath sounds. No stridor. No wheezing, rhonchi or rales.   Musculoskeletal:      Comments: Left lower extremity evaluation she does have gross swelling visible over the lower leg compared to right leg she does have substantial moderate to severe swelling of the lateral malleus on the left side normal DP and PT pulses normal sensation over the foot normal dorsi flexion and plantarflexion strength no calf tenderness no obvious erythema.   Skin:     General: Skin is warm.      Capillary Refill: Capillary refill takes less than 2 seconds.      Findings: No bruising or erythema.   Neurological:      General: No focal deficit present.      Mental Status: She is alert and oriented to person, place, and time.      Motor: No weakness.         Procedures    Point of Care Test & Imaging Results from this visit  No results found for this visit on 06/15/25.   Imaging  No results found.    Cardiology, Vascular, and Other Imaging  No other imaging results found for the past 2 days      Diagnostic study results (if any) were reviewed by [facility].    Assessment/Plan   Allergies, medications, history, and " pertinent labs/EKGs/Imaging reviewed by Julian Marquez PA-C.     Medical Decision Making:    Patient is an 84-year-old female presenting to the clinic with complaints of left lower leg swelling.  She has had the swelling since 3:30 AM.  Patient states the swelling occurred abruptly over the left lower extremity she has swelling over the shin as well as left ankle no known injury.  No numbness or tingling or weakness.  No associated redness.  No calf pain.  No recent hospitalizations.  No recent long travel or surgeries per patient.  Patient states she developed abrupt swelling left lower leg.  Patient is concerned about blood clot. Left lower extremity evaluation she does have gross swelling visible over the lower leg compared to right leg she does have substantial moderate to severe swelling of the lateral malleus on the left side normal DP and PT pulses normal sensation over the foot normal dorsi flexion and plantarflexion strength no calf tenderness no obvious erythema.  X-ray completed pending radiology interpretation I do not see any signs of acute fracture or dislocation patient currently with abrupt swelling left lower extremity I am concerned that she has upper swelling of the left leg likely pulled into the ankle secondary to gravity.  There is no known injury and no signs of acute cellulitis. I would like patient to have an evaluation in the emergency room as well as ultrasonography to rule out DVT.    Orders and Diagnoses  There are no diagnoses linked to this encounter.    Medical Admin Record      Patient disposition: ED    Electronically signed by [facility]  11:03 AM           [1] (Not in a hospital admission)  [2]   Past Medical History:  Diagnosis Date    Arthritis     Depression     Glaucoma     Grade 2 out of 6 intensity murmur     Hyperlipidemia     Hypertension     Macular degeneration     Personal history of other diseases of the circulatory system 06/30/2014    History of  hypertension    Personal history of other diseases of the circulatory system 06/30/2014    Personal history of cardiac murmur    Personal history of other diseases of the musculoskeletal system and connective tissue 06/30/2014    Personal history of arthritis    Personal history of other infectious and parasitic diseases 06/30/2014    History of mumps    Personal history of other mental and behavioral disorders 06/30/2014    History of depression    Primary osteoarthritis of left knee     Sleep apnea     Unspecified visual disturbance 06/30/2014    Vision disorder   [3]   Past Surgical History:  Procedure Laterality Date    APPENDECTOMY  06/30/2014    Appendectomy    BREAST BIOPSY      CATARACT EXTRACTION  06/30/2014    Cataract Surgery    COLONOSCOPY      ESOPHAGOGASTRODUODENOSCOPY      KNEE ARTHROPLASTY      KNEE SURGERY  06/30/2014    Knee Surgery Right

## 2025-06-15 NOTE — ED TRIAGE NOTES
Pt here from urgent care with complaints of left ankle swelling that started today. Urgent care did an xray that showed no fracture. Pt denies shortness of breath or chest pain. Pain increases with weight bearing. Denies injury.

## 2025-06-15 NOTE — PATIENT INSTRUCTIONS
Patient is an 84-year-old female presenting to the clinic with complaints of left lower leg swelling.  She has had the swelling since 3:30 AM.  Patient states the swelling occurred abruptly over the left lower extremity she has swelling over the shin as well as left ankle no known injury.  No numbness or tingling or weakness.  No associated redness.  No recent hospitalizations.  No recent long travel or surgeries per patient.  Patient states she developed abrupt swelling left lower leg.  Patient is concerned about blood clot. Left lower extremity evaluation she does have gross swelling visible over the lower leg compared to right leg she does have substantial moderate to severe swelling of the lateral malleus on the left side normal DP and PT pulses normal sensation over the foot normal dorsi flexion and plantarflexion strength no calf tenderness no obvious erythema.  X-ray completed pending radiology interpretation I do not see any signs of acute fracture or dislocation patient currently with abrupt swelling left lower extremity I am concerned that she has upper swelling of the left leg likely pulled into the ankle secondary to gravity.  There is no known injury and no signs of acute cellulitis.  I would like patient to have an evaluation in the emergency room as well as ultrasonography to rule out DVT.

## 2025-06-15 NOTE — ED PROVIDER NOTES
Department of Emergency Medicine   ED  Provider Note  Admit Date/RoomTime: 6/15/2025  3:10 PM  ED Room: CHAIR02/CHAIR02                  History of Present Illness:   Samanta Muñoz is a 84 y.o. female presenting to the ED for left ankle pain and swelling, beginning a.m. last night.  The complaint has been persistent, moderate in severity, and worsened by nothing.  No history of trauma or injury.  No fever or chills.  She was seen at urgent care today and sent to the ER to evaluate for possible DVT.  I do not appreciate any significant swelling of the left leg in comparison to the right.  She does have swelling over the anterior talofibular ligament of the left ankle.      Review of Systems:   Pertinent positives and review of systems as noted above.  Remaining 10 review of systems is negative or noncontributory to today's episode of care.        --------------------------------------------- PAST HISTORY ---------------------------------------------  Past Medical History:  has a past medical history of Arthritis, Depression, Glaucoma, Grade 2 out of 6 intensity murmur, Hyperlipidemia, Hypertension, Macular degeneration, Personal history of other diseases of the circulatory system (06/30/2014), Personal history of other diseases of the circulatory system (06/30/2014), Personal history of other diseases of the musculoskeletal system and connective tissue (06/30/2014), Personal history of other infectious and parasitic diseases (06/30/2014), Personal history of other mental and behavioral disorders (06/30/2014), Primary osteoarthritis of left knee, Sleep apnea, and Unspecified visual disturbance (06/30/2014).    Past Surgical History:  has a past surgical history that includes Appendectomy (06/30/2014); Cataract extraction (06/30/2014); Knee surgery (06/30/2014); Knee Arthroplasty; Colonoscopy; Esophagogastroduodenoscopy; and Breast biopsy.    Social History:  reports that she quit smoking about 35 years ago. Her  smoking use included cigarettes. She has never used smokeless tobacco. She reports that she does not currently use alcohol. She reports that she does not use drugs.    Family History: family history includes Diabetes in her mother; Macular degeneration in her father. Unless otherwise noted, family history is non contributory    Patient's Medications   New Prescriptions    No medications on file   Previous Medications    ASCORBIC ACID (VITAMIN C) 500 MG TABLET    Take 1 tablet (500 mg) by mouth 2 times a day.    ASPIRIN 325 MG EC TABLET    Take 1 tablet (325 mg) by mouth 2 times a day.    ATORVASTATIN (LIPITOR) 80 MG TABLET    Take 1 tablet (80 mg) by mouth once daily.    CARISOPRODOL (SOMA) 350 MG TABLET    Take 1 tablet (350 mg) by mouth 3 times a day as needed for muscle spasms.    CELECOXIB (CELEBREX) 200 MG CAPSULE    Take 1 capsule (200 mg) by mouth 2 times a day as needed for mild pain (1 - 3).    DOCUSATE SODIUM (COLACE) 100 MG CAPSULE    Take 1 capsule (100 mg) by mouth 2 times a day as needed for constipation.    GABAPENTIN (NEURONTIN) 300 MG CAPSULE    Take 1 capsule (300 mg) by mouth 3 times a day as needed (nerve pain).    LATANOPROST (XALATAN) 0.005 % OPHTHALMIC SOLUTION    Administer 1 drop into both eyes once daily at bedtime.    LISINOPRIL 40 MG TABLET    Take 1 tablet (40 mg) by mouth once daily.    ONDANSETRON (ZOFRAN) 4 MG TABLET    Take 1 tablet (4 mg) by mouth every 6 hours if needed for nausea or vomiting.    OXYBUTYNIN XL (DITROPAN-XL) 15 MG 24 HR TABLET    Take 1 tablet (15 mg) by mouth once daily. Do not crush, chew, or split.    OXYCODONE-ACETAMINOPHEN (PERCOCET) 5-325 MG TABLET    Take 1 tablet by mouth every 6 hours if needed for severe pain (7 - 10).    PANTOPRAZOLE (PROTONIX) 40 MG EC TABLET    Take 1 tablet (40 mg) by mouth 2 times a day. Do not crush, chew, or split.    SERTRALINE (ZOLOFT) 50 MG TABLET    Take 1 tablet (50 mg) by mouth once daily.   Modified Medications    No  "medications on file   Discontinued Medications    No medications on file      The patient’s home medications have been reviewed.    Allergies: Dye, Povidone-iodine, and Iodinated contrast media    -------------------------------------------------- RESULTS -------------------------------------------------  All laboratory and radiology results have been personally reviewed by myself   LABS:  Labs Reviewed   CBC WITH AUTO DIFFERENTIAL - Abnormal       Result Value    WBC 8.7      nRBC 0.0      RBC 4.45      Hemoglobin 13.3      Hematocrit 40.1      MCV 90      MCH 29.9      MCHC 33.2      RDW 13.1      Platelets 147 (*)     Neutrophils % 70.1      Immature Granulocytes %, Automated 0.2      Lymphocytes % 19.6      Monocytes % 8.8      Eosinophils % 1.0      Basophils % 0.3      Neutrophils Absolute 6.12 (*)     Immature Granulocytes Absolute, Automated 0.02      Lymphocytes Absolute 1.71      Monocytes Absolute 0.77      Eosinophils Absolute 0.09      Basophils Absolute 0.03     C-REACTIVE PROTEIN - Abnormal    C-Reactive Protein 1.11 (*)    BASIC METABOLIC PANEL - Abnormal    Glucose 139 (*)     Sodium 139      Potassium 4.2      Chloride 103      Bicarbonate 28      Anion Gap 12      Urea Nitrogen 23      Creatinine 1.04      eGFR 54 (*)     Calcium 9.5     URIC ACID - Normal    Uric Acid 5.2           RADIOLOGY:  Interpreted by Radiologist.  Lower extremity venous duplex left             No results found for this or any previous visit (from the past 4464 hours).  ------------------------- NURSING NOTES AND VITALS REVIEWED ---------------------------   The nursing notes within the ED encounter and vital signs as below have been reviewed.   /86   Pulse 62   Temp 36.7 °C (98 °F) (Temporal)   Resp 16   Ht 1.575 m (5' 2\")   Wt 79.4 kg (175 lb)   LMP  (LMP Unknown)   SpO2 96%   BMI 32.01 kg/m²   Oxygen Saturation Interpretation: Normal      ---------------------------------------------------PHYSICAL " EXAM--------------------------------------  Physical Exam  Vitals and nursing note reviewed.   Constitutional:       General: She is not in acute distress.     Appearance: Normal appearance. She is not ill-appearing or toxic-appearing.   HENT:      Head: Normocephalic.      Nose: Nose normal.   Eyes:      General: No scleral icterus.     Extraocular Movements: Extraocular movements intact.      Conjunctiva/sclera: Conjunctivae normal.   Cardiovascular:      Rate and Rhythm: Normal rate and regular rhythm.      Pulses: Normal pulses.      Heart sounds: Normal heart sounds.   Pulmonary:      Effort: Pulmonary effort is normal.      Breath sounds: Normal breath sounds.   Musculoskeletal:         General: Swelling and tenderness present. No deformity or signs of injury. Normal range of motion.      Cervical back: Normal range of motion and neck supple. No rigidity.      Right lower leg: No edema.      Left lower leg: No edema.      Comments: There is some swelling over the anterior talofibular ligament of the left ankle.  I do not appreciate significant swelling of the left lower leg in comparison to the right leg.  The right calf measures 41 cm.  The left calf measures 41.5 cm.  There is no erythema.  There is no lymphangitis.  There are no palpable cords.  There is no pitting edema.  The left foot is neurovascularly intact.  There is no ecchymosis.  There is good DP and PT pulses.  Good capillary refill.   Skin:     General: Skin is warm and dry.      Findings: No rash.   Neurological:      General: No focal deficit present.      Mental Status: She is alert and oriented to person, place, and time.            Procedures  None  ------------------------------ ED COURSE/MEDICAL DECISION MAKING----------------------    Medical Decision Making:   Seen and examined by me  Patient was placed in an Ace wrap.  Rest ice elevation.  She may take Tylenol and/or ibuprofen as needed for pain.  She has had negative imaging at the  urgent care as far as no fracture or bony injury.  Lab work here is normal.  Ultrasound is negative for clot.  Reviewed all these findings with the patient and the patient's family member.    ED Course as of 06/15/25 1715   Sun Kp 15, 2025   1651 CBC is unremarkable [EC]   1651 BMP is unremarkable [EC]   1651 C-reactive protein is 1.11 [EC]   1651 Uric acid is normal [EC]   1652 Venous duplex scan of the left lower leg is negative for DVT. [EC]      ED Course User Index  [EC] Chao Aguilera DO         Diagnoses as of 06/15/25 1715   Pain and swelling of left ankle      Counseling:   The emergency provider has spoken with the patient and discussed today’s results, in addition to providing specific details for the plan of care and counseling regarding the diagnosis and prognosis.  Questions are answered at this time and they are agreeable with the plan.      --------------------------------- IMPRESSION AND DISPOSITION ---------------------------------        IMPRESSION  1. Pain and swelling of left ankle        DISPOSITION  Disposition: Discharge to home  Patient condition is fair      Billing Provider Critical Care Time: 0 minutes     Chao Aguilera DO  06/15/25 1715

## 2025-06-15 NOTE — DISCHARGE INSTRUCTIONS
Blood work is unremarkable  Ultrasound imaging of the left leg is negative for blood clot.  Wear ace wrap (or support stocking) as needed for ankle swelling  REST and ICE and ELEVATE ankle as needed for pain.  Take tylenol and/or ibuprofen as needed for pain  Follow up with your primary care doctor this week for recheck in next 3-5 days as needed.

## 2025-06-16 ENCOUNTER — TELEPHONE (OUTPATIENT)
Dept: URGENT CARE | Age: 85
End: 2025-06-16

## (undated) DEVICE — GLOVE, SURGICAL, PROTEXIS NEOPRENE, 8.5, PF, LF

## (undated) DEVICE — SUTURE, V-LOC, 3-0, 23IN, P-12, 90 ABS

## (undated) DEVICE — SUTURE, CTD, VICRYL, 2-0, UND, BR, CT-2

## (undated) DEVICE — ADHESIVE, SKIN, LIQUIBAND EXCEED

## (undated) DEVICE — SOLUTION, IRRIGATION, SODIUM CHLORIDE 0.9%, 1000 ML, POUR BOTTLE

## (undated) DEVICE — IRRIGATION SYSTEM, WOUND, PULSAVAC PLUS

## (undated) DEVICE — NEEDLE, SPINAL, 20 G X 3.5 IN, YELLOW HUB

## (undated) DEVICE — CUFF, TOURNIQUET, 34 X 4, SNGL PORT/SNGL BLADDER, DISP, LF

## (undated) DEVICE — HEMOSTAT, ARISTA, ABSORBABLE, 3 GRAMS

## (undated) DEVICE — DRESSING, GAUZE, SPONGE, KERLIX, SUPER, 6 X 6.75 IN, STERILE 10PK

## (undated) DEVICE — COVER, TABLE, 44 X 75 IN, DISPOSABLE, LF, STERILE

## (undated) DEVICE — BLADE, OSCILLATOR 19.5 X 86 X 1.27MM

## (undated) DEVICE — SYRINGE, 60 CC, LUER LOCK, MONOJECT, W/O CAP, LF

## (undated) DEVICE — CUFF, TOURNIQUET, 30 X 4, SNGL PORT/SNGL BLADDER, DISP, LF

## (undated) DEVICE — KIT, MINOR, DOUBLE BASIN

## (undated) DEVICE — TIP, SUCTION, YANKAUER, FLEXIBLE

## (undated) DEVICE — GLOVE, SURGICAL, PROTEXIS NEOPRENE, 8.0, PF, LF

## (undated) DEVICE — SUTURE, VICRYL, 1, 36 IN, CT-1, UNDYED

## (undated) DEVICE — DRESSING, MEPILEX BORDER, POST-OP AG, 4 X 10 IN

## (undated) DEVICE — Device

## (undated) DEVICE — DRAPE PACK, TOTAL KNEE, CUSTOM, GEAUGA

## (undated) DEVICE — IRRIGATION SYSTEM, WOUND, SURGIPHOR, 450ML, STERILE

## (undated) DEVICE — CATHETER TRAY, SURESTEP, 14FR, PRECONNECTED DRAIN BAG